# Patient Record
Sex: FEMALE | Race: WHITE | URBAN - METROPOLITAN AREA
[De-identification: names, ages, dates, MRNs, and addresses within clinical notes are randomized per-mention and may not be internally consistent; named-entity substitution may affect disease eponyms.]

---

## 2022-11-01 ENCOUNTER — LAB ENCOUNTER (OUTPATIENT)
Dept: LAB | Facility: HOSPITAL | Age: 42
End: 2022-11-01
Attending: ADVANCED PRACTICE MIDWIFE
Payer: COMMERCIAL

## 2022-11-01 ENCOUNTER — OFFICE VISIT (OUTPATIENT)
Dept: OBGYN CLINIC | Facility: CLINIC | Age: 42
End: 2022-11-01
Payer: COMMERCIAL

## 2022-11-01 VITALS
DIASTOLIC BLOOD PRESSURE: 68 MMHG | HEART RATE: 75 BPM | WEIGHT: 132 LBS | SYSTOLIC BLOOD PRESSURE: 107 MMHG | BODY MASS INDEX: 22.53 KG/M2 | HEIGHT: 64 IN

## 2022-11-01 DIAGNOSIS — Z13.1 DIABETES MELLITUS SCREENING: ICD-10-CM

## 2022-11-01 DIAGNOSIS — Z12.31 SCREENING MAMMOGRAM FOR BREAST CANCER: Primary | ICD-10-CM

## 2022-11-01 DIAGNOSIS — Z23 NEED FOR HPV VACCINATION: ICD-10-CM

## 2022-11-01 DIAGNOSIS — Z13.29 THYROID DISORDER SCREEN: ICD-10-CM

## 2022-11-01 DIAGNOSIS — Z13.220 LIPID SCREENING: ICD-10-CM

## 2022-11-01 DIAGNOSIS — Z32.00 PREGNANCY EXAMINATION OR TEST, PREGNANCY UNCONFIRMED: ICD-10-CM

## 2022-11-01 DIAGNOSIS — Z01.419 ENCOUNTER FOR ANNUAL ROUTINE GYNECOLOGICAL EXAMINATION: ICD-10-CM

## 2022-11-01 LAB
ALBUMIN SERPL-MCNC: 3.9 G/DL (ref 3.4–5)
ALBUMIN/GLOB SERPL: 1.1 {RATIO} (ref 1–2)
ALP LIVER SERPL-CCNC: 38 U/L
ALT SERPL-CCNC: 19 U/L
ANION GAP SERPL CALC-SCNC: 8 MMOL/L (ref 0–18)
AST SERPL-CCNC: 17 U/L (ref 15–37)
BILIRUB SERPL-MCNC: 0.3 MG/DL (ref 0.1–2)
BUN BLD-MCNC: 12 MG/DL (ref 7–18)
BUN/CREAT SERPL: 16.2 (ref 10–20)
CALCIUM BLD-MCNC: 9.3 MG/DL (ref 8.5–10.1)
CHLORIDE SERPL-SCNC: 108 MMOL/L (ref 98–112)
CHOLEST SERPL-MCNC: 292 MG/DL (ref ?–200)
CO2 SERPL-SCNC: 24 MMOL/L (ref 21–32)
CONTROL LINE PRESENT WITH A CLEAR BACKGROUND (YES/NO): YES YES/NO
CREAT BLD-MCNC: 0.74 MG/DL
DEPRECATED RDW RBC AUTO: 44.2 FL (ref 35.1–46.3)
ERYTHROCYTE [DISTWIDTH] IN BLOOD BY AUTOMATED COUNT: 12.4 % (ref 11–15)
EST. AVERAGE GLUCOSE BLD GHB EST-MCNC: 105 MG/DL (ref 68–126)
FASTING PATIENT LIPID ANSWER: YES
FASTING STATUS PATIENT QL REPORTED: YES
GFR SERPLBLD BASED ON 1.73 SQ M-ARVRAT: 105 ML/MIN/1.73M2 (ref 60–?)
GLOBULIN PLAS-MCNC: 3.5 G/DL (ref 2.8–4.4)
GLUCOSE BLD-MCNC: 104 MG/DL (ref 70–99)
HBA1C MFR BLD: 5.3 % (ref ?–5.7)
HCT VFR BLD AUTO: 36.9 %
HDLC SERPL-MCNC: 106 MG/DL (ref 40–59)
HGB BLD-MCNC: 12.1 G/DL
LDLC SERPL CALC-MCNC: 170 MG/DL (ref ?–100)
MCH RBC QN AUTO: 31.5 PG (ref 26–34)
MCHC RBC AUTO-ENTMCNC: 32.8 G/DL (ref 31–37)
MCV RBC AUTO: 96.1 FL
NONHDLC SERPL-MCNC: 186 MG/DL (ref ?–130)
OSMOLALITY SERPL CALC.SUM OF ELEC: 290 MOSM/KG (ref 275–295)
PLATELET # BLD AUTO: 238 10(3)UL (ref 150–450)
POTASSIUM SERPL-SCNC: 4.5 MMOL/L (ref 3.5–5.1)
PREGNANCY TEST, URINE: NEGATIVE
PROT SERPL-MCNC: 7.4 G/DL (ref 6.4–8.2)
RBC # BLD AUTO: 3.84 X10(6)UL
SODIUM SERPL-SCNC: 140 MMOL/L (ref 136–145)
TRIGL SERPL-MCNC: 99 MG/DL (ref 30–149)
TSI SER-ACNC: 1.56 MIU/ML (ref 0.36–3.74)
VLDLC SERPL CALC-MCNC: 20 MG/DL (ref 0–30)
WBC # BLD AUTO: 5.4 X10(3) UL (ref 4–11)

## 2022-11-01 PROCEDURE — 84443 ASSAY THYROID STIM HORMONE: CPT

## 2022-11-01 PROCEDURE — 3008F BODY MASS INDEX DOCD: CPT | Performed by: ADVANCED PRACTICE MIDWIFE

## 2022-11-01 PROCEDURE — 80053 COMPREHEN METABOLIC PANEL: CPT

## 2022-11-01 PROCEDURE — 80061 LIPID PANEL: CPT

## 2022-11-01 PROCEDURE — 81025 URINE PREGNANCY TEST: CPT | Performed by: ADVANCED PRACTICE MIDWIFE

## 2022-11-01 PROCEDURE — 85027 COMPLETE CBC AUTOMATED: CPT

## 2022-11-01 PROCEDURE — 83036 HEMOGLOBIN GLYCOSYLATED A1C: CPT

## 2022-11-01 PROCEDURE — 3078F DIAST BP <80 MM HG: CPT | Performed by: ADVANCED PRACTICE MIDWIFE

## 2022-11-01 PROCEDURE — 3074F SYST BP LT 130 MM HG: CPT | Performed by: ADVANCED PRACTICE MIDWIFE

## 2022-11-01 PROCEDURE — 90471 IMMUNIZATION ADMIN: CPT | Performed by: ADVANCED PRACTICE MIDWIFE

## 2022-11-01 PROCEDURE — 90651 9VHPV VACCINE 2/3 DOSE IM: CPT | Performed by: ADVANCED PRACTICE MIDWIFE

## 2022-11-01 PROCEDURE — 36415 COLL VENOUS BLD VENIPUNCTURE: CPT

## 2022-11-01 PROCEDURE — 99386 PREV VISIT NEW AGE 40-64: CPT | Performed by: ADVANCED PRACTICE MIDWIFE

## 2022-11-01 RX ORDER — NORGESTIMATE AND ETHINYL ESTRADIOL 7DAYSX3 28
1 KIT ORAL DAILY
Qty: 84 TABLET | Refills: 0 | Status: SHIPPED | OUTPATIENT
Start: 2022-11-01

## 2022-11-01 RX ORDER — NORGESTIMATE AND ETHINYL ESTRADIOL 7DAYSX3 28
KIT ORAL
COMMUNITY
Start: 2022-07-22 | End: 2022-11-01

## 2022-12-01 ENCOUNTER — TELEPHONE (OUTPATIENT)
Dept: OBGYN CLINIC | Facility: CLINIC | Age: 42
End: 2022-12-01

## 2022-12-06 NOTE — TELEPHONE ENCOUNTER
Spoke to patient, advised overdue mammogram. Patient stated she has been meaning to call back. Number given to patient to call and schedule. Patient requesting to schedule 2nd gardasil vaccine. appt schedule.  Patient agrees with plan and verbally understands

## 2023-01-04 ENCOUNTER — NURSE ONLY (OUTPATIENT)
Dept: OBGYN CLINIC | Facility: CLINIC | Age: 43
End: 2023-01-04
Payer: COMMERCIAL

## 2023-01-04 VITALS — SYSTOLIC BLOOD PRESSURE: 108 MMHG | DIASTOLIC BLOOD PRESSURE: 78 MMHG | HEART RATE: 64 BPM

## 2023-01-04 DIAGNOSIS — Z23 NEED FOR HPV VACCINATION: Primary | ICD-10-CM

## 2023-01-04 PROCEDURE — 90471 IMMUNIZATION ADMIN: CPT | Performed by: ADVANCED PRACTICE MIDWIFE

## 2023-01-04 PROCEDURE — 90651 9VHPV VACCINE 2/3 DOSE IM: CPT | Performed by: ADVANCED PRACTICE MIDWIFE

## 2023-01-04 RX ORDER — NORGESTIMATE AND ETHINYL ESTRADIOL 7DAYSX3 28
1 KIT ORAL DAILY
Qty: 84 TABLET | Refills: 3 | Status: SHIPPED | OUTPATIENT
Start: 2023-01-04

## 2023-01-04 NOTE — PROGRESS NOTES
Gardasil #2 given as ordered. Pt tolerated it well. Watched for 15mins post injection & pt experienced no side effects.  Pt to return for dose #3 in

## 2023-02-05 ENCOUNTER — HOSPITAL ENCOUNTER (OUTPATIENT)
Dept: MAMMOGRAPHY | Facility: HOSPITAL | Age: 43
Discharge: HOME | End: 2023-02-05
Attending: ADVANCED PRACTICE MIDWIFE
Payer: COMMERCIAL

## 2023-02-05 ENCOUNTER — HOSPITAL ENCOUNTER (OUTPATIENT)
Dept: MAMMOGRAPHY | Facility: HOSPITAL | Age: 43
End: 2023-02-05
Attending: ADVANCED PRACTICE MIDWIFE
Payer: COMMERCIAL

## 2023-02-05 DIAGNOSIS — Z01.419 ENCOUNTER FOR ANNUAL ROUTINE GYNECOLOGICAL EXAMINATION: ICD-10-CM

## 2023-02-05 DIAGNOSIS — Z12.31 SCREENING MAMMOGRAM FOR BREAST CANCER: ICD-10-CM

## 2023-02-05 PROCEDURE — 77067 SCR MAMMO BI INCL CAD: CPT | Performed by: ADVANCED PRACTICE MIDWIFE

## 2023-02-05 PROCEDURE — 77063 BREAST TOMOSYNTHESIS BI: CPT | Performed by: ADVANCED PRACTICE MIDWIFE

## 2023-04-18 ENCOUNTER — OFFICE VISIT (OUTPATIENT)
Dept: FAMILY MEDICINE CLINIC | Facility: CLINIC | Age: 43
End: 2023-04-18
Payer: COMMERCIAL

## 2023-04-18 VITALS
HEART RATE: 93 BPM | HEIGHT: 64 IN | TEMPERATURE: 97 F | DIASTOLIC BLOOD PRESSURE: 82 MMHG | OXYGEN SATURATION: 98 % | WEIGHT: 136 LBS | RESPIRATION RATE: 20 BRPM | SYSTOLIC BLOOD PRESSURE: 114 MMHG | BODY MASS INDEX: 23.22 KG/M2

## 2023-04-18 DIAGNOSIS — J04.0 LARYNGITIS: Primary | ICD-10-CM

## 2023-04-18 PROBLEM — M21.70 SHORT LEG SYNDROME, RIGHT, ACQUIRED: Status: ACTIVE | Noted: 2018-05-22

## 2023-04-18 PROCEDURE — 3074F SYST BP LT 130 MM HG: CPT

## 2023-04-18 PROCEDURE — 99203 OFFICE O/P NEW LOW 30 MIN: CPT

## 2023-04-18 PROCEDURE — 3008F BODY MASS INDEX DOCD: CPT

## 2023-04-18 PROCEDURE — 3079F DIAST BP 80-89 MM HG: CPT

## 2023-06-11 ENCOUNTER — OFFICE VISIT (OUTPATIENT)
Dept: FAMILY MEDICINE CLINIC | Facility: CLINIC | Age: 43
End: 2023-06-11
Payer: COMMERCIAL

## 2023-06-11 VITALS
OXYGEN SATURATION: 100 % | WEIGHT: 137.19 LBS | TEMPERATURE: 98 F | HEIGHT: 64 IN | DIASTOLIC BLOOD PRESSURE: 86 MMHG | BODY MASS INDEX: 23.42 KG/M2 | SYSTOLIC BLOOD PRESSURE: 112 MMHG | HEART RATE: 70 BPM | RESPIRATION RATE: 18 BRPM

## 2023-06-11 DIAGNOSIS — J06.9 URI WITH COUGH AND CONGESTION: Primary | ICD-10-CM

## 2023-06-11 PROBLEM — M99.05 PELVIC SOMATIC DYSFUNCTION: Status: ACTIVE | Noted: 2018-05-22

## 2023-06-11 PROBLEM — M89.8X1 CHRONIC SCAPULAR PAIN: Status: ACTIVE | Noted: 2018-05-22

## 2023-06-11 PROBLEM — M53.3 SACROILIAC JOINT DYSFUNCTION OF RIGHT SIDE: Status: ACTIVE | Noted: 2018-05-22

## 2023-06-11 PROBLEM — R29.3 POOR POSTURE: Status: ACTIVE | Noted: 2018-05-22

## 2023-06-11 PROBLEM — M25.551 HIP PAIN, RIGHT: Status: ACTIVE | Noted: 2018-05-22

## 2023-06-11 PROBLEM — G89.29 CHRONIC LEFT SHOULDER PAIN: Status: ACTIVE | Noted: 2018-05-22

## 2023-06-11 PROBLEM — M25.512 CHRONIC LEFT SHOULDER PAIN: Status: ACTIVE | Noted: 2018-05-22

## 2023-06-11 PROBLEM — M79.18 MYOFASCIAL PAIN: Status: ACTIVE | Noted: 2018-05-22

## 2023-06-11 PROBLEM — G89.29 CHRONIC SCAPULAR PAIN: Status: ACTIVE | Noted: 2018-05-22

## 2023-06-11 PROCEDURE — 3008F BODY MASS INDEX DOCD: CPT | Performed by: NURSE PRACTITIONER

## 2023-06-11 PROCEDURE — 3074F SYST BP LT 130 MM HG: CPT | Performed by: NURSE PRACTITIONER

## 2023-06-11 PROCEDURE — 3079F DIAST BP 80-89 MM HG: CPT | Performed by: NURSE PRACTITIONER

## 2023-06-11 PROCEDURE — 99213 OFFICE O/P EST LOW 20 MIN: CPT | Performed by: NURSE PRACTITIONER

## 2023-12-14 RX ORDER — NORGESTIMATE AND ETHINYL ESTRADIOL 7DAYSX3 28
1 KIT ORAL DAILY
Qty: 84 TABLET | Refills: 3 | OUTPATIENT
Start: 2023-12-14

## 2023-12-27 ENCOUNTER — OFFICE VISIT (OUTPATIENT)
Dept: OBGYN CLINIC | Facility: CLINIC | Age: 43
End: 2023-12-27
Payer: COMMERCIAL

## 2023-12-27 VITALS
WEIGHT: 144 LBS | SYSTOLIC BLOOD PRESSURE: 118 MMHG | HEART RATE: 88 BPM | HEIGHT: 64 IN | DIASTOLIC BLOOD PRESSURE: 84 MMHG | BODY MASS INDEX: 24.59 KG/M2

## 2023-12-27 DIAGNOSIS — Z01.419 ENCOUNTER FOR GYNECOLOGICAL EXAMINATION WITHOUT ABNORMAL FINDING: Primary | ICD-10-CM

## 2023-12-27 DIAGNOSIS — Z23 NEED FOR HPV VACCINATION: ICD-10-CM

## 2023-12-27 PROCEDURE — 90651 9VHPV VACCINE 2/3 DOSE IM: CPT | Performed by: ADVANCED PRACTICE MIDWIFE

## 2023-12-27 PROCEDURE — 90471 IMMUNIZATION ADMIN: CPT | Performed by: ADVANCED PRACTICE MIDWIFE

## 2023-12-27 PROCEDURE — 3008F BODY MASS INDEX DOCD: CPT | Performed by: ADVANCED PRACTICE MIDWIFE

## 2023-12-27 PROCEDURE — 3074F SYST BP LT 130 MM HG: CPT | Performed by: ADVANCED PRACTICE MIDWIFE

## 2023-12-27 PROCEDURE — 3079F DIAST BP 80-89 MM HG: CPT | Performed by: ADVANCED PRACTICE MIDWIFE

## 2023-12-27 PROCEDURE — 99396 PREV VISIT EST AGE 40-64: CPT | Performed by: ADVANCED PRACTICE MIDWIFE

## 2023-12-27 RX ORDER — NORGESTIMATE AND ETHINYL ESTRADIOL 7DAYSX3 28
1 KIT ORAL DAILY
Qty: 84 TABLET | Refills: 3 | Status: SHIPPED | OUTPATIENT
Start: 2023-12-27

## 2023-12-27 NOTE — PROGRESS NOTES
Chief Complaint   Patient presents with    Annual     Last pap  normal results, 3rd hpv vaccine , OCP refill,       HPI:   Cortney Lopez is 41year old , here today for routine annual gyn exam. Needs last of the HPV vaccine series and OCP refill. Remains happy with the method. Routine STI screening: declines  Cycles: monthly/regular  Family history of breast, ovarian, or uterine cancer: denies  Significant Gyn history: Had an abnormal pap smear about 10 years ago, all normal since. Last pap was at office affiliated with [facility] in , normal  Last mammogram 2023, WNL  Does not have a PCP and welcomes a referral    Note: This is a gyn only visit. Pt has PCP and is referred back to PCP for care of any non-gyn concerns listed above in the Problem List.    HISTORY:  No past medical history on file. Hx Prior Abnormal Pap: No  Pap Date: 21  Pap Result Notes: Negative    Past Surgical History:   Procedure Laterality Date    REMOVAL OF TONSILS,12+ Y/O      WISDOM TEETH REMOVED Bilateral       Family History   Problem Relation Age of Onset    Cancer Maternal Grandfather       Social History:   Social History     Socioeconomic History    Marital status: Single   Tobacco Use    Smoking status: Never     Passive exposure: Never    Smokeless tobacco: Never   Substance and Sexual Activity    Alcohol use:  Yes     Alcohol/week: 1.0 standard drink of alcohol     Types: 1 Glasses of wine per week     Comment: social    Drug use: Never       Safe relationship/safe home environment      Depression Screening (PHQ-2/PHQ-9): Over the LAST 2 WEEKS   Little interest or pleasure in doing things (over the last two weeks)?: Not at all    Feeling down, depressed, or hopeless (over the last two weeks)?: Not at all    PHQ-2 SCORE: 0        Immunization History   Administered Date(s) Administered    Hpv Virus Vaccine 9 Elsa Im 2022, 2023        Medications (Active prior to today's visit):  Current Outpatient Medications   Medication Sig Dispense Refill    TRI-ESTARYLLA 0.18/0.215/0.25 MG-35 MCG Oral Tab Take 1 tablet by mouth daily. 84 tablet 3       Allergies:  No Known Allergies    ROS:  Review of Systems   Constitutional: Negative. Genitourinary: Negative. PHYSICAL EXAM:  Vitals:    12/27/23 0845   BP: 118/84   Pulse: 88     Physical Exam  Vitals reviewed. Constitutional:       Appearance: Normal appearance. HENT:      Head: Normocephalic. Pulmonary:      Effort: Pulmonary effort is normal.   Chest:   Breasts:     Breasts are symmetrical.      Right: No inverted nipple, mass, nipple discharge or tenderness. Left: No inverted nipple, mass, nipple discharge or tenderness. Genitourinary:     Comments: Pelvic exam deferred. No contributory history  Neurological:      Mental Status: She is alert and oriented to person, place, and time. Psychiatric:         Behavior: Behavior normal.         Thought Content: Thought content normal.         Judgment: Judgment normal.           ASSESSMENT/PLAN:     Luis Alberto Covarrubias was seen today for annual.    Diagnoses and all orders for this visit:    Encounter for gynecological examination without abnormal finding  -     MICHELLE SCREENING BILAT (CPT=77067); Future  -     TRI-ESTARYLLA 0.18/0.215/0.25 MG-35 MCG Oral Tab; Take 1 tablet by mouth daily. Need for HPV vaccination  -     HPV HUMAN PAPILLOMA VIRUS VACC 9 ALEJANDRO 3 DOSE IM       Next annual due: 1 year  Follow-up/Return to clinic: next available with PCP to establish care and for other routine health maintenance. Primary care brochure provided with recommendations at [facility] per request    Counseling:   Frequency of health screening and personal risks  Pap, SBE/CBE, mammography    Patient verbalized understanding, All questions answered.  No barriers to learning identified

## 2024-01-15 ENCOUNTER — APPOINTMENT (OUTPATIENT)
Dept: MRI IMAGING | Facility: HOSPITAL | Age: 44
End: 2024-01-15
Attending: EMERGENCY MEDICINE
Payer: COMMERCIAL

## 2024-01-15 ENCOUNTER — HOSPITAL ENCOUNTER (OUTPATIENT)
Age: 44
Discharge: ANOTHER HOSPITAL | End: 2024-01-15
Payer: COMMERCIAL

## 2024-01-15 ENCOUNTER — HOSPITAL ENCOUNTER (EMERGENCY)
Facility: HOSPITAL | Age: 44
Discharge: HOME | End: 2024-01-15
Attending: EMERGENCY MEDICINE
Payer: COMMERCIAL

## 2024-01-15 VITALS
DIASTOLIC BLOOD PRESSURE: 80 MMHG | WEIGHT: 138 LBS | BODY MASS INDEX: 23.56 KG/M2 | SYSTOLIC BLOOD PRESSURE: 113 MMHG | TEMPERATURE: 98 F | HEART RATE: 62 BPM | HEIGHT: 64 IN | RESPIRATION RATE: 16 BRPM | OXYGEN SATURATION: 99 %

## 2024-01-15 VITALS
SYSTOLIC BLOOD PRESSURE: 113 MMHG | HEART RATE: 103 BPM | TEMPERATURE: 98 F | DIASTOLIC BLOOD PRESSURE: 79 MMHG | OXYGEN SATURATION: 100 % | RESPIRATION RATE: 18 BRPM

## 2024-01-15 DIAGNOSIS — H81.10 BENIGN PAROXYSMAL POSITIONAL VERTIGO, UNSPECIFIED LATERALITY: Primary | ICD-10-CM

## 2024-01-15 DIAGNOSIS — R42 DIZZINESS: Primary | ICD-10-CM

## 2024-01-15 DIAGNOSIS — R06.02 SOB (SHORTNESS OF BREATH): ICD-10-CM

## 2024-01-15 LAB
ALBUMIN SERPL-MCNC: 4.3 G/DL (ref 3.2–4.8)
ALBUMIN/GLOB SERPL: 1.5 {RATIO} (ref 1–2)
ALP LIVER SERPL-CCNC: 37 U/L
ALT SERPL-CCNC: 10 U/L
ANION GAP SERPL CALC-SCNC: 6 MMOL/L (ref 0–18)
AST SERPL-CCNC: 17 U/L (ref ?–34)
ATRIAL RATE: 64 BPM
B-HCG UR QL: NEGATIVE
BASOPHILS # BLD AUTO: 0.08 X10(3) UL (ref 0–0.2)
BASOPHILS NFR BLD AUTO: 1.6 %
BILIRUB SERPL-MCNC: 0.4 MG/DL (ref 0.3–1.2)
BUN BLD-MCNC: 12 MG/DL (ref 9–23)
BUN/CREAT SERPL: 15 (ref 10–20)
CALCIUM BLD-MCNC: 9.3 MG/DL (ref 8.7–10.4)
CHLORIDE SERPL-SCNC: 106 MMOL/L (ref 98–112)
CO2 SERPL-SCNC: 27 MMOL/L (ref 21–32)
CREAT BLD-MCNC: 0.8 MG/DL
DEPRECATED RDW RBC AUTO: 42.3 FL (ref 35.1–46.3)
EGFRCR SERPLBLD CKD-EPI 2021: 95 ML/MIN/1.73M2 (ref 60–?)
EOSINOPHIL # BLD AUTO: 0.22 X10(3) UL (ref 0–0.7)
EOSINOPHIL NFR BLD AUTO: 4.3 %
ERYTHROCYTE [DISTWIDTH] IN BLOOD BY AUTOMATED COUNT: 12.3 % (ref 11–15)
GLOBULIN PLAS-MCNC: 2.9 G/DL (ref 2.8–4.4)
GLUCOSE BLD-MCNC: 101 MG/DL (ref 70–99)
HCT VFR BLD AUTO: 38.6 %
HGB BLD-MCNC: 12.6 G/DL
IMM GRANULOCYTES # BLD AUTO: 0.02 X10(3) UL (ref 0–1)
IMM GRANULOCYTES NFR BLD: 0.4 %
LYMPHOCYTES # BLD AUTO: 1.7 X10(3) UL (ref 1–4)
LYMPHOCYTES NFR BLD AUTO: 33.5 %
MCH RBC QN AUTO: 30.3 PG (ref 26–34)
MCHC RBC AUTO-ENTMCNC: 32.6 G/DL (ref 31–37)
MCV RBC AUTO: 92.8 FL
MONOCYTES # BLD AUTO: 0.22 X10(3) UL (ref 0.1–1)
MONOCYTES NFR BLD AUTO: 4.3 %
NEUTROPHILS # BLD AUTO: 2.84 X10 (3) UL (ref 1.5–7.7)
NEUTROPHILS # BLD AUTO: 2.84 X10(3) UL (ref 1.5–7.7)
NEUTROPHILS NFR BLD AUTO: 55.9 %
OSMOLALITY SERPL CALC.SUM OF ELEC: 288 MOSM/KG (ref 275–295)
P AXIS: 68 DEGREES
P-R INTERVAL: 134 MS
PLATELET # BLD AUTO: 434 10(3)UL (ref 150–450)
POTASSIUM SERPL-SCNC: 3.9 MMOL/L (ref 3.5–5.1)
PROT SERPL-MCNC: 7.2 G/DL (ref 5.7–8.2)
Q-T INTERVAL: 404 MS
QRS DURATION: 78 MS
QTC CALCULATION (BEZET): 416 MS
R AXIS: 72 DEGREES
RBC # BLD AUTO: 4.16 X10(6)UL
SODIUM SERPL-SCNC: 139 MMOL/L (ref 136–145)
T AXIS: 56 DEGREES
VENTRICULAR RATE: 64 BPM
WBC # BLD AUTO: 5.1 X10(3) UL (ref 4–11)

## 2024-01-15 PROCEDURE — 99284 EMERGENCY DEPT VISIT MOD MDM: CPT

## 2024-01-15 PROCEDURE — 99214 OFFICE O/P EST MOD 30 MIN: CPT | Performed by: PHYSICIAN ASSISTANT

## 2024-01-15 PROCEDURE — 93010 ELECTROCARDIOGRAM REPORT: CPT

## 2024-01-15 PROCEDURE — 93005 ELECTROCARDIOGRAM TRACING: CPT

## 2024-01-15 PROCEDURE — 96374 THER/PROPH/DIAG INJ IV PUSH: CPT

## 2024-01-15 PROCEDURE — 85025 COMPLETE CBC W/AUTO DIFF WBC: CPT | Performed by: EMERGENCY MEDICINE

## 2024-01-15 PROCEDURE — 81025 URINE PREGNANCY TEST: CPT

## 2024-01-15 PROCEDURE — 70551 MRI BRAIN STEM W/O DYE: CPT | Performed by: EMERGENCY MEDICINE

## 2024-01-15 PROCEDURE — 80053 COMPREHEN METABOLIC PANEL: CPT | Performed by: EMERGENCY MEDICINE

## 2024-01-15 RX ORDER — ONDANSETRON 2 MG/ML
4 INJECTION INTRAMUSCULAR; INTRAVENOUS ONCE
Status: COMPLETED | OUTPATIENT
Start: 2024-01-15 | End: 2024-01-15

## 2024-01-15 RX ORDER — MECLIZINE HYDROCHLORIDE 25 MG/1
25 TABLET ORAL 3 TIMES DAILY PRN
Qty: 20 TABLET | Refills: 0 | Status: SHIPPED | OUTPATIENT
Start: 2024-01-15

## 2024-01-15 RX ORDER — ONDANSETRON 4 MG/1
4 TABLET, ORALLY DISINTEGRATING ORAL EVERY 4 HOURS PRN
Qty: 10 TABLET | Refills: 0 | Status: SHIPPED | OUTPATIENT
Start: 2024-01-15 | End: 2024-01-22

## 2024-01-15 RX ORDER — MECLIZINE HYDROCHLORIDE 25 MG/1
25 TABLET ORAL ONCE
Status: COMPLETED | OUTPATIENT
Start: 2024-01-15 | End: 2024-01-15

## 2024-01-15 NOTE — ED PROVIDER NOTES
Patient Seen in: [facility] Emergency Department      History     Chief Complaint   Patient presents with    Dizziness     Stated Complaint: dizziness    Subjective:   HPI    43-year-old female without significant past medical history presents with complaints of dizziness and nausea.  The patient reports onset of symptoms yesterday while she was exercising.  She reports that she was doing Pilates when she began experiencing dizziness.  She states the symptoms were mild at the time.  She stopped exercising and the symptoms seemed to improve.  She then began having symptoms again this morning while she was lying in bed.  She reports feeling dizziness as if things were moving or spinning.  She reports the symptoms were mild again this morning but when she got out of bed and began to walk she reports the symptoms became much more intense.  She reports associated nausea without vomiting.  She denies any headache.  She denies any vision or speech changes.  She denies focal weakness or numbness.  She initially presented to immediate care and was sent to the ED for evaluation.  She reports minimal nausea at present but continues to report dizziness especially with head movement.  No history of similar symptoms in the past.    Objective:   History reviewed. No pertinent past medical history.           Past Surgical History:   Procedure Laterality Date    REMOVAL OF TONSILS,12+ Y/O  2000    WISDOM TEETH REMOVED Bilateral 2002                Social History     Socioeconomic History    Marital status: Single   Tobacco Use    Smoking status: Never     Passive exposure: Never    Smokeless tobacco: Never   Substance and Sexual Activity    Alcohol use: Yes     Alcohol/week: 1.0 standard drink of alcohol     Types: 1 Glasses of wine per week     Comment: social    Drug use: Never              Review of Systems    Positive for stated complaint: dizziness  Other systems are as noted in HPI.  Constitutional and vital signs  reviewed.      All other systems reviewed and negative except as noted above.    Physical Exam     ED Triage Vitals [01/15/24 0956]   /79   Pulse 79   Resp 18   Temp 97.6 °F (36.4 °C)   Temp src Oral   SpO2 100 %   O2 Device None (Room air)       Current:/80   Pulse 62   Temp 97.6 °F (36.4 °C) (Oral)   Resp 12   Ht 162.6 cm (5' 4\")   Wt 62.6 kg   LMP 01/10/2024 (Approximate)   SpO2 100%   BMI 23.69 kg/m²         Physical Exam      General Appearance: alert, no distress  Eyes: pupils equal and round no pallor or injection.  Mild rightward nystagmus noted.  EOMI.  ENT, Mouth: mucous membranes moist.  Bilateral TMs and auditory canals normal-appearing.  Respiratory: there are no retractions, lungs are clear to auscultation  Cardiovascular: regular rate and rhythm  Gastrointestinal:  abdomen is soft and non tender, no masses, bowel sounds normal  Neurological: Speech normal.  Cranial nerves II through XII intact.  No focal motor or sensory deficits to extremities x 4.  Cerebellum intact to finger-to-nose.  Skin: warm and dry, no rashes.  Musculoskeletal: neck is supple non tender        Extremities are symmetrical, full range of motion  Psychiatric: patient is oriented X 3, there is no agitation    DIFFERENTIAL DIAGNOSIS: After history and physical exam differential diagnosis was considered for vertigo including central causes such as benign positional vertigo, Ménière's disease, viral labyrinthitis and central causes such as CVA, tumor        ED Course     Labs Reviewed   COMP METABOLIC PANEL (14) - Abnormal; Notable for the following components:       Result Value    Glucose 101 (*)     All other components within normal limits   POCT PREGNANCY URINE - Normal   CBC WITH DIFFERENTIAL WITH PLATELET    Narrative:     The following orders were created for panel order CBC With Differential With Platelet.  Procedure                               Abnormality         Status                     ---------                                -----------         ------                     CBC W/ DIFFERENTIAL[[id number]]                              Final result                 Please view results for these tests on the individual orders.   RAINBOW DRAW BLUE   RAINBOW DRAW GOLD   CBC W/ DIFFERENTIAL     EKG    Rate, intervals and axes as noted on EKG Report.  Rate: 64  Rhythm: Sinus Rhythm  Axis: Normal  Reading: Normal EKG                        MDM      Lab, EKG, and MRI results noted.  Patient with some improvement post medications.  Suspect benign positional vertigo.  Will discharge home with meclizine, Zofran, and primary care follow-up.  She is advised to return if worsening symptoms develop.                                   Medical Decision Making      Disposition and Plan     Clinical Impression:  1. Benign paroxysmal positional vertigo, unspecified laterality         Disposition:  Discharge  1/15/2024  1:31 pm    Follow-up:  Anila Braun MD  [address] [place] [id number]  [phone]    Follow up            Medications Prescribed:  Current Discharge Medication List        START taking these medications    Details   ondansetron 4 MG Oral Tablet Dispersible Take 1 tablet (4 mg total) by mouth every 4 (four) hours as needed for Nausea.  Qty: 10 tablet, Refills: 0      meclizine 25 MG Oral Tab Take 1 tablet (25 mg total) by mouth 3 (three) times daily as needed.  Qty: 20 tablet, Refills: 0

## 2024-01-15 NOTE — ED QUICK NOTES
Patient reports dizziness like the room is spinning during her morning workout yesterday she stopped exercising, dizziness subsided and then she ate some food; she noticed some difficulty walking down the stairs. Dizziness did not return until last night while she was sleeping she could feel the dizziness again and states since she's been here its gotten better and noticed her balance was off and couldn't walk in a straight line, while in ED cart she noticed after turing her head it made dizziness/room spin; pt never dx with vertigo and never had this happen to her before so felt the need to get checked out by a doctor. AXOX4. No slurred speech, unilateral weakness, arm drift, facial droop, or numbness or tingling.

## 2024-01-15 NOTE — ED PROVIDER NOTES
Patient Seen in: [facility]      History     Chief Complaint   Patient presents with    Dizziness     Stated Complaint: Dizziness    Subjective:   HPI    42 yo female with no PMH presenting with c/o dizziness.  States yesterday during an ab work out she began to feel dizzy \"like she would pass out\" She stopped the work out, had something to eat and symptoms resolved. This AM while laying in bed she again had dizziness with head movements, feels like she can't walk in a straight line. There is associated nausea and also pt notes when walking she feels like work of breathing is increased. She denies recent URI symptoms. No recent change in medications. She has not taken anything for her symptoms. Denies weakness, speech changes, HA.     Objective:   History reviewed. No pertinent past medical history.           Past Surgical History:   Procedure Laterality Date    REMOVAL OF TONSILS,12+ Y/O  2000    WISDOM TEETH REMOVED Bilateral 2002                Social History     Socioeconomic History    Marital status: Single   Tobacco Use    Smoking status: Never     Passive exposure: Never    Smokeless tobacco: Never   Substance and Sexual Activity    Alcohol use: Yes     Alcohol/week: 1.0 standard drink of alcohol     Types: 1 Glasses of wine per week     Comment: social    Drug use: Never              Review of Systems    Positive for stated complaint: Dizziness  Other systems are as noted in HPI.  Constitutional and vital signs reviewed.      All other systems reviewed and negative except as noted above.    Physical Exam     ED Triage Vitals [01/15/24 0854]   /79   Pulse 103   Resp 18   Temp 98.3 °F (36.8 °C)   Temp src Temporal   SpO2 100 %   O2 Device None (Room air)       Current:/79   Pulse 103   Temp 98.3 °F (36.8 °C) (Temporal)   Resp 18   LMP 01/10/2024 (Approximate)   SpO2 100%         Physical Exam  Vitals and nursing note reviewed.   Constitutional:       General: She is not in  acute distress.  HENT:      Head: Normocephalic and atraumatic.      Right Ear: External ear normal.      Left Ear: External ear normal.      Nose: Nose normal.      Mouth/Throat:      Mouth: Mucous membranes are moist.   Eyes:      Extraocular Movements: Extraocular movements intact.      Pupils: Pupils are equal, round, and reactive to light.   Cardiovascular:      Rate and Rhythm: Normal rate.   Pulmonary:      Effort: Pulmonary effort is normal.   Abdominal:      General: Abdomen is flat.   Musculoskeletal:         General: Normal range of motion.      Cervical back: Normal range of motion.   Skin:     General: Skin is warm.   Neurological:      General: No focal deficit present.      Mental Status: She is alert and oriented to person, place, and time.      Cranial Nerves: No cranial nerve deficit.   Psychiatric:         Mood and Affect: Mood normal.         Behavior: Behavior normal.               ED Course   Labs Reviewed - No data to display     43-year-old female presenting for evaluation of dizziness.  Patient describes initial episode yesterday while working out which sounds more syncopal in nature.  Today on waking dizziness when looking side-to-side, head movements more suggestive of BPPV  She has no history of vertigo.  Denies any experience with dizziness.  She is on OCP, heart rate 103 and describes feeling short of breath with exertion    Ddx-BPPV, near syncope, PE, TIA versus CVA  Discussed with patient and friend the limitations of this facility and the need for additional workup, potentially imaging related to her chief complaint.  She is agreeable to [facility] at this time. Friend will drive her              [facility]                                         Medical Decision Making      Disposition and Plan     Clinical Impression:  1. Dizziness    2. SOB (shortness of breath)         Disposition:  Ic to ed  1/15/2024 10:06 am    Follow-up:  No follow-up provider specified.        Medications  Prescribed:  Discharge Medication List as of 1/15/2024  9:30 AM

## 2024-01-15 NOTE — ED INITIAL ASSESSMENT (HPI)
Patient states yesterday, she was working out, felt dizzy like she will pass out, stopped working out, and \"felt weird rest of the day.\" Since this morning, patient states she has been dizzy and off balance resulting in nausea.  +\"face feels hot since this morning.\"

## 2024-01-15 NOTE — DISCHARGE INSTRUCTIONS
Take meclizine as prescribed, as needed for dizziness.  Take Zofran as needed for nausea or vomiting.  Drink plenty of fluids.  Follow-up with a primary care physician for reevaluation.  Return to the emergency department if worsening dizziness, focal weakness, repeated vomiting or other new symptoms develop.

## 2024-01-15 NOTE — ED INITIAL ASSESSMENT (HPI)
Sudden onset of dizziness this morning. Feels unsteady walking. Denies feeling \"faint\", acute illness, new medications

## 2024-01-26 ENCOUNTER — TELEPHONE (OUTPATIENT)
Dept: OBGYN CLINIC | Facility: CLINIC | Age: 44
End: 2024-01-26

## 2024-02-14 ENCOUNTER — HOSPITAL ENCOUNTER (OUTPATIENT)
Dept: MAMMOGRAPHY | Age: 44
Discharge: HOME | End: 2024-02-14
Attending: ADVANCED PRACTICE MIDWIFE
Payer: COMMERCIAL

## 2024-02-14 DIAGNOSIS — Z01.419 ENCOUNTER FOR GYNECOLOGICAL EXAMINATION WITHOUT ABNORMAL FINDING: ICD-10-CM

## 2024-02-14 PROCEDURE — 77063 BREAST TOMOSYNTHESIS BI: CPT | Performed by: ADVANCED PRACTICE MIDWIFE

## 2024-02-14 PROCEDURE — 77067 SCR MAMMO BI INCL CAD: CPT | Performed by: ADVANCED PRACTICE MIDWIFE

## 2024-03-23 ENCOUNTER — LAB ENCOUNTER (OUTPATIENT)
Dept: LAB | Age: 44
End: 2024-03-23
Attending: STUDENT IN AN ORGANIZED HEALTH CARE EDUCATION/TRAINING PROGRAM
Payer: COMMERCIAL

## 2024-03-23 ENCOUNTER — OFFICE VISIT (OUTPATIENT)
Dept: FAMILY MEDICINE CLINIC | Facility: CLINIC | Age: 44
End: 2024-03-23

## 2024-03-23 VITALS
BODY MASS INDEX: 23.39 KG/M2 | DIASTOLIC BLOOD PRESSURE: 65 MMHG | WEIGHT: 137 LBS | HEIGHT: 64 IN | SYSTOLIC BLOOD PRESSURE: 100 MMHG | HEART RATE: 87 BPM | OXYGEN SATURATION: 96 %

## 2024-03-23 DIAGNOSIS — Z00.00 WELL ADULT EXAM: Primary | ICD-10-CM

## 2024-03-23 DIAGNOSIS — Z00.00 WELL ADULT EXAM: ICD-10-CM

## 2024-03-23 DIAGNOSIS — M72.2 PLANTAR FASCIITIS: ICD-10-CM

## 2024-03-23 LAB
ALBUMIN SERPL-MCNC: 4.6 G/DL (ref 3.2–4.8)
ALBUMIN/GLOB SERPL: 1.6 {RATIO} (ref 1–2)
ALP LIVER SERPL-CCNC: 35 U/L
ALT SERPL-CCNC: 19 U/L
ANION GAP SERPL CALC-SCNC: 5 MMOL/L (ref 0–18)
AST SERPL-CCNC: 27 U/L (ref ?–34)
BILIRUB SERPL-MCNC: 0.6 MG/DL (ref 0.3–1.2)
BUN BLD-MCNC: 13 MG/DL (ref 9–23)
BUN/CREAT SERPL: 14.3 (ref 10–20)
CALCIUM BLD-MCNC: 9.5 MG/DL (ref 8.7–10.4)
CHLORIDE SERPL-SCNC: 107 MMOL/L (ref 98–112)
CHOLEST SERPL-MCNC: 257 MG/DL (ref ?–200)
CO2 SERPL-SCNC: 27 MMOL/L (ref 21–32)
CREAT BLD-MCNC: 0.91 MG/DL
DEPRECATED RDW RBC AUTO: 46.7 FL (ref 35.1–46.3)
EGFRCR SERPLBLD CKD-EPI 2021: 81 ML/MIN/1.73M2 (ref 60–?)
ERYTHROCYTE [DISTWIDTH] IN BLOOD BY AUTOMATED COUNT: 13.3 % (ref 11–15)
EST. AVERAGE GLUCOSE BLD GHB EST-MCNC: 105 MG/DL (ref 68–126)
FASTING PATIENT LIPID ANSWER: YES
FASTING STATUS PATIENT QL REPORTED: YES
GLOBULIN PLAS-MCNC: 2.8 G/DL (ref 2.8–4.4)
GLUCOSE BLD-MCNC: 86 MG/DL (ref 70–99)
HBA1C MFR BLD: 5.3 % (ref ?–5.7)
HCT VFR BLD AUTO: 36.9 %
HDLC SERPL-MCNC: 84 MG/DL (ref 40–59)
HGB BLD-MCNC: 12.2 G/DL
LDLC SERPL CALC-MCNC: 148 MG/DL (ref ?–100)
MCH RBC QN AUTO: 31.3 PG (ref 26–34)
MCHC RBC AUTO-ENTMCNC: 33.1 G/DL (ref 31–37)
MCV RBC AUTO: 94.6 FL
NONHDLC SERPL-MCNC: 173 MG/DL (ref ?–130)
OSMOLALITY SERPL CALC.SUM OF ELEC: 287 MOSM/KG (ref 275–295)
PLATELET # BLD AUTO: 237 10(3)UL (ref 150–450)
POTASSIUM SERPL-SCNC: 4.4 MMOL/L (ref 3.5–5.1)
PROT SERPL-MCNC: 7.4 G/DL (ref 5.7–8.2)
RBC # BLD AUTO: 3.9 X10(6)UL
SODIUM SERPL-SCNC: 139 MMOL/L (ref 136–145)
TRIGL SERPL-MCNC: 142 MG/DL (ref 30–149)
TSI SER-ACNC: 1.97 MIU/ML (ref 0.55–4.78)
VIT D+METAB SERPL-MCNC: 13.5 NG/ML (ref 30–100)
VLDLC SERPL CALC-MCNC: 27 MG/DL (ref 0–30)
WBC # BLD AUTO: 6.5 X10(3) UL (ref 4–11)

## 2024-03-23 PROCEDURE — 3008F BODY MASS INDEX DOCD: CPT | Performed by: STUDENT IN AN ORGANIZED HEALTH CARE EDUCATION/TRAINING PROGRAM

## 2024-03-23 PROCEDURE — 3078F DIAST BP <80 MM HG: CPT | Performed by: STUDENT IN AN ORGANIZED HEALTH CARE EDUCATION/TRAINING PROGRAM

## 2024-03-23 PROCEDURE — 3074F SYST BP LT 130 MM HG: CPT | Performed by: STUDENT IN AN ORGANIZED HEALTH CARE EDUCATION/TRAINING PROGRAM

## 2024-03-23 PROCEDURE — 80053 COMPREHEN METABOLIC PANEL: CPT

## 2024-03-23 PROCEDURE — 85027 COMPLETE CBC AUTOMATED: CPT

## 2024-03-23 PROCEDURE — 80061 LIPID PANEL: CPT

## 2024-03-23 PROCEDURE — 83036 HEMOGLOBIN GLYCOSYLATED A1C: CPT

## 2024-03-23 PROCEDURE — 84443 ASSAY THYROID STIM HORMONE: CPT

## 2024-03-23 PROCEDURE — 82306 VITAMIN D 25 HYDROXY: CPT

## 2024-03-23 PROCEDURE — 99386 PREV VISIT NEW AGE 40-64: CPT | Performed by: STUDENT IN AN ORGANIZED HEALTH CARE EDUCATION/TRAINING PROGRAM

## 2024-03-23 PROCEDURE — 36415 COLL VENOUS BLD VENIPUNCTURE: CPT

## 2024-03-23 NOTE — PROGRESS NOTES
HPI:    Patient ID: Maria Luz Beasley is a 43 year old female.    HPI  Pt presenting for routine physical exam. Denies any acute issues or recent illnesses. No significant chronic medical problems. Past medical/surgical history, family history, and social history were reviewed.     Last physical 2022  Last Pap 8/2021    New job with more time  Working on dietary consistency  Reports h/o plantar fasciitis    Mood stable, denies SH/SI/HI      Review of Systems   A comprehensive 10 point review of systems was completed.  Pertinent positives and negatives noted in the the HPI.       Current Outpatient Medications   Medication Sig Dispense Refill    TRI-ESTARYLLA 0.18/0.215/0.25 MG-35 MCG Oral Tab Take 1 tablet by mouth daily. 84 tablet 3    ergocalciferol 1.25 MG (10540 UT) Oral Cap Take 1 capsule (50,000 Units total) by mouth once a week. 24 capsule 0     Allergies:No Known Allergies   Vitals:    03/23/24 0945   BP: 100/65   Pulse: 87   SpO2: 96%   Weight: 137 lb (62.1 kg)   Height: 5' 4\" (1.626 m)       Body mass index is 23.52 kg/m².   PHYSICAL EXAM:   Physical Exam  Vitals reviewed.   Constitutional:       General: She is not in acute distress.     Appearance: Normal appearance. She is well-developed.   HENT:      Head: Normocephalic and atraumatic.      Right Ear: Tympanic membrane, ear canal and external ear normal.      Left Ear: Tympanic membrane, ear canal and external ear normal.   Eyes:      Conjunctiva/sclera: Conjunctivae normal.   Neck:      Thyroid: No thyroid mass or thyroid tenderness.   Cardiovascular:      Rate and Rhythm: Normal rate and regular rhythm.      Pulses: Normal pulses.      Heart sounds: Normal heart sounds, S1 normal and S2 normal. No murmur heard.  Pulmonary:      Effort: Pulmonary effort is normal. No respiratory distress.      Breath sounds: Normal breath sounds. No wheezing, rhonchi or rales.   Abdominal:      General: Bowel sounds are normal.      Palpations: Abdomen is soft.       Tenderness: There is no abdominal tenderness. There is no guarding or rebound.   Musculoskeletal:      Cervical back: Normal range of motion and neck supple. No muscular tenderness.      Right lower leg: No edema.      Left lower leg: No edema.   Lymphadenopathy:      Cervical: No cervical adenopathy.   Skin:     General: Skin is warm and dry.      Coloration: Skin is not jaundiced.   Neurological:      General: No focal deficit present.      Mental Status: She is alert and oriented to person, place, and time. Mental status is at baseline.   Psychiatric:         Attention and Perception: Attention normal.         Mood and Affect: Mood normal.         Behavior: Behavior normal. Behavior is cooperative.         Cognition and Memory: Cognition normal.             ASSESSMENT/PLAN:   1. Well adult exam  Discussed preventative screenings  - Pap 8/2021  - mammogram 2/2024  - will check labs as below  - encouraged to continue diet/exercise for overall wellness  - follow-up with eye doctor annually  - follow-up with dentist every 6 months  - return yearly for physicals  - annual flu shot  - tetanus booster every 10yrs  - TSH W Reflex To Free T4; Future  - CBC, Platelet; No Differential; Future  - Comp Metabolic Panel (14); Future  - Hemoglobin A1C; Future  - Lipid Panel; Future  - Vitamin D; Future    2. Plantar fasciitis  Encouraged stretching exercises, icing as able    Pt verbalized understanding and agrees with plan.    Orders Placed This Encounter   Procedures    TSH W Reflex To Free T4    CBC, Platelet; No Differential    Comp Metabolic Panel (14)    Hemoglobin A1C    Lipid Panel    Vitamin D       Meds This Visit:  Requested Prescriptions      No prescriptions requested or ordered in this encounter       Imaging & Referrals:  None         ID#[id number]

## 2024-06-05 ENCOUNTER — HOSPITAL ENCOUNTER (OUTPATIENT)
Age: 44
Discharge: HOME | End: 2024-06-05
Payer: COMMERCIAL

## 2024-06-05 VITALS
DIASTOLIC BLOOD PRESSURE: 74 MMHG | RESPIRATION RATE: 22 BRPM | SYSTOLIC BLOOD PRESSURE: 140 MMHG | OXYGEN SATURATION: 100 % | HEART RATE: 85 BPM | TEMPERATURE: 98 F

## 2024-06-05 DIAGNOSIS — H81.10 BENIGN PAROXYSMAL POSITIONAL VERTIGO, UNSPECIFIED LATERALITY: Primary | ICD-10-CM

## 2024-06-05 PROCEDURE — 99213 OFFICE O/P EST LOW 20 MIN: CPT | Performed by: NURSE PRACTITIONER

## 2024-06-05 NOTE — DISCHARGE INSTRUCTIONS
Appointment with Dr. Bah on Friday morning at 7 AM.  He will do further testing and evaluation and determine best protocol for your symptoms    Take meclizine 3 times a day to help with symptoms you are experiencing.  This medication can help with fluid buildup in the ear that can cause increased dizziness.  If this medication makes you sleepy or drowsy take 1 before bedtime nightly.    Go to ER for worsening symptoms including numbness or tingling to extremities, slurred speech, unable to form sentences or speech, worsening dizziness, vision changes, worst headache of your life

## 2024-06-05 NOTE — ED INITIAL ASSESSMENT (HPI)
Pt c/o intermittent dizziness x6 days, worse with moving head.  Hx of vertigo x1 1/2024. Seen at [facility] ER and had MRI done.  States started while working out.   States better after doing vertigo exercises.  No ear pain.  [place] the country [place] 3 [place].

## 2024-06-05 NOTE — ED PROVIDER NOTES
Patient Seen in: [facility]      History     Chief Complaint   Patient presents with    Ear Problem     vertigo (2nd episode ever, first was in Jan 2024) - Entered by patient    Dizziness     Stated Complaint: Ear Problem - vertigo (2nd episode ever, first was in Jan 2024)    Subjective:   HPI    43-year-old female here for evaluation of intermittent dizziness x 6 days.  She reports this is triggered usually when exercising and lying back on her back.  She reports feeling nausea on Saturday when this was most intense, taking a Zofran she had from previous similar incidents.  She reports worsening symptoms when she moves her head up and down so has been trying not to do this.  She has been doing a few head maneuvers she looked up online to help with vertigo which has helped for the most part and she is feeling better today.  She reports still feeling not 100% so came in for evaluation as she leaves out of [place] on [place].  She denies ear pain or tinnitus today but reports sometimes feeling a sharp pain that resolves quickly in her right ear.  She was seen by the immediate care and then emergency room in January for similar symptoms, had an MRI and blood work which were all negative.  She did not follow-up with primary care provider or ENT after this.  She has not had any vertigo in the past prior to January's episode.      Objective:   Past Medical History:    Hyperlipidemia              Past Surgical History:   Procedure Laterality Date    Removal of tonsils,14+ y/o  2000    Tonsillectomy      [place] teeth removed Bilateral 2002                Social History     Socioeconomic History    Marital status: Single   Tobacco Use    Smoking status: Never     Passive exposure: Never    Smokeless tobacco: Never   Vaping Use    Vaping status: Never Used   Substance and Sexual Activity    Alcohol use: Yes     Alcohol/week: 2.0 standard drinks of alcohol     Types: 2 Glasses of wine per week     Comment:  social    Drug use: Not Currently     Types: Cannabis              Review of Systems    Positive for stated complaint: Ear Problem - vertigo (2nd episode ever, first was in Jan 2024)  Other systems are as noted in HPI.  Constitutional and vital signs reviewed.      All other systems reviewed and negative except as noted above.    Physical Exam     ED Triage Vitals [06/05/24 1315]   /74   Pulse 85   Resp 22   Temp 98 °F (36.7 °C)   Temp src Temporal   SpO2 100 %   O2 Device None (Room air)       Current Vitals:   Vital Signs  BP: 140/74  Pulse: 85  Resp: 22  Temp: 98 °F (36.7 °C)  Temp src: Temporal    Oxygen Therapy  SpO2: 100 %  O2 Device: None (Room air)            Physical Exam  Vitals and nursing note reviewed.   Constitutional:       General: She is not in acute distress.     Appearance: Normal appearance. She is not ill-appearing, toxic-appearing or diaphoretic.   HENT:      Head: Normocephalic and atraumatic. No raccoon eyes, Del Castillo's sign, right periorbital erythema or left periorbital erythema.      Jaw: There is normal jaw occlusion.      Right Ear: Tympanic membrane, ear canal and external ear normal. No drainage, swelling or tenderness. No middle ear effusion. No mastoid tenderness. No hemotympanum. Tympanic membrane is not injected, perforated, erythematous or bulging.      Left Ear: Tympanic membrane, ear canal and external ear normal. No drainage, swelling or tenderness.  No middle ear effusion. No mastoid tenderness. No hemotympanum. Tympanic membrane is not injected, perforated, erythematous or bulging.      Nose: Nose normal. No congestion or rhinorrhea.      Mouth/Throat:      Lips: Pink.      Mouth: Mucous membranes are moist.      Pharynx: Oropharynx is clear. Uvula midline. No pharyngeal swelling, oropharyngeal exudate, posterior oropharyngeal erythema or uvula swelling.      Tonsils: No tonsillar exudate or tonsillar abscesses.   Eyes:      General: Lids are normal. No visual field  deficit or scleral icterus.        Right eye: No discharge.         Left eye: No discharge.      Extraocular Movements: Extraocular movements intact.      Right eye: Normal extraocular motion and no nystagmus.      Left eye: Normal extraocular motion and no nystagmus.      Conjunctiva/sclera: Conjunctivae normal.      Right eye: Right conjunctiva is not injected. No hemorrhage.     Left eye: Left conjunctiva is not injected. No hemorrhage.     Pupils: Pupils are equal, round, and reactive to light.   Cardiovascular:      Rate and Rhythm: Normal rate.      Pulses: Normal pulses.   Pulmonary:      Effort: Pulmonary effort is normal. No respiratory distress.      Breath sounds: Normal breath sounds and air entry.   Musculoskeletal:         General: Normal range of motion.      Cervical back: Full passive range of motion without pain, normal range of motion and neck supple. No rigidity, tenderness or bony tenderness. No spinous process tenderness or muscular tenderness.      Thoracic back: No bony tenderness.      Lumbar back: No bony tenderness.   Skin:     General: Skin is warm.      Findings: No rash.   Neurological:      Mental Status: She is alert and oriented to person, place, and time.      Cranial Nerves: No cranial nerve deficit, dysarthria or facial asymmetry.      Sensory: No sensory deficit.      Motor: No weakness or pronator drift.      Coordination: Coordination normal.      Gait: Gait normal.   Psychiatric:         Mood and Affect: Mood normal.         Behavior: Behavior normal.               ED Course   Labs Reviewed - No data to display                   MDM     43-year-old female here for evaluation of dizziness that has been on and off for the last 6 days.  She reports having a vertigo diagnosed in January, this is her second episode of similar symptoms.  Denies any neurologic symptoms otherwise no numbness or tingling, vision changes, slurred speech, difficulty finding words.    On exam patient  well-appearing in no distress lungs are clear with no wheezing stridor or crackles, bilateral TM normal, there is no swelling, erythema, blood in canals.  There is no drainage.  Nontender mastoid.  Full range of motion to jaw.  Good equal  bilaterally, sensation intact to arms legs and face equal bilaterally, no pronator drift noted, no slurred speech or facial droop noted.  Pulse PERRL intact EOM no nystagmus.    Differential diagnoses reflecting the complexity of care include but are not limited to benign positional vertigo, Ménière's disease, viral labyrinthitis.    Comorbidities that add complexity to management include: None  History obtained by an independent source was from: Patient  My independent interpretations of studies include: None performed  Shared decision making was done by: Patient and myself  Discussions of management was done with: Patient    Patient is well appearing, non-toxic and in no acute distress.  Vital signs are stable.   Writer called ENT for an appointment for patient.  Able to get patient into Dr. Bah Friday morning for reevaluation and determine if further tests or medications would be necessary.    Patient agrees with this plan.  Advised to take meclizine which she has at home from previous episode, up to 3 times a day as needed.  Patient reports not liking to take medications if it is just a Band-Aid and not to resolve it.  Discussed this may help for her symptoms in the meantime until she sees ENT for further investigation    Advised on p.o. fluids, decrease salt intake, slow steady movements, avoiding sudden change of position especially with head.  Discussed ER precautions given on discharge paperwork.  All questions answered. Return and ER precautions given.    Counseled: Patient, regarding diagnosis, regarding treatment plan, regarding diagnostic results, regarding prescription, I have discussed with the patient the results of tests, differential diagnosis, and warning  signs and symptoms that should prompt immediate return. The patient understands these instructions and agrees to the follow-up plan provided. There is no barriers to learning. Appropriate f/u given. Patient agrees to return for any concerns/ problems/complications.                                       Medical Decision Making      Disposition and Plan     Clinical Impression:  1. Benign paroxysmal positional vertigo, unspecified laterality         Disposition:  Discharge  6/5/2024  1:45 pm    Follow-up:  Swapnil Bah MD  [address] [place] [id number]  [phone]    Schedule an appointment as soon as possible for a visit in 2 days  7:20AM appt, arrive at 7am, bring ID and Insurance- Yellow parking LOT          Medications Prescribed:  Discharge Medication List as of 6/5/2024  1:46 PM

## 2024-06-07 ENCOUNTER — OFFICE VISIT (OUTPATIENT)
Dept: OTOLARYNGOLOGY | Facility: CLINIC | Age: 44
End: 2024-06-07
Payer: COMMERCIAL

## 2024-06-07 DIAGNOSIS — R42 DIZZINESS: Primary | ICD-10-CM

## 2024-06-07 NOTE — PROGRESS NOTES
Maria Luz Beasley is a 43 year old female.   Chief Complaint   Patient presents with    Vertigo     Pt c/o vertigo, no nausea or vomiting        ASSESSMENT AND PLAN:   1. Dizziness  43-year-old presents with recurrent vertigo.  She says that this has happened 2 or 3 times now and associated with certain movements such as when she was laying down for an exercise.  Her symptoms are currently resolved over the last 1 to 2 days.  She had an MRI scan in January of this year that had no acute intracranial abnormality except for a partially empty sella configuration    On exam she had a normal ear exam and no gross nystagmus was present    I gave her an order for physical therapy in case this happens again for her to call to schedule hopefully when she is acutely symptomatic so that they can test and possibly treat for possible BPPV based upon her history.  If she has a hard time getting in in a timely fashion she can contact our office to help coordinate the appointment.  She is agreeable to plan. Consult from Dr Barbour regarding dizziness    - PHYSICAL THERAPY - INTERNAL      The patient indicates understanding of these issues and agrees to the plan.      EXAM:   [facility] 05/22/2024 (Approximate)     Pertinent exam findings may also be noted above in assessment and plan     System Details   Skin Inspection - Normal.   Constitutional Overall appearance - Normal.   Head/Face Symmetric, TMJ tenderness not present    Eyes EOMI, PERRL   Right ear:  Canal clear, TM intact, no MIR   Left ear:  Canal clear, TM intact, no MIR   Nose: Septum midline, inferior turbinates not enlarged, nasal valves without collapse    Oral cavity/Oropharynx: No lesions or masses on inspection or palpation, tonsils symmetric    Neck: Soft without LAD, thyroid not enlarged  Voice clear/ no stridor   Other:      Scopes and Procedures:             Current Outpatient Medications   Medication Sig Dispense Refill    TRI-ESTARYLLA 0.18/0.215/0.25 MG-35 MCG  Oral Tab Take 1 tablet by mouth daily. 84 tablet 3    ergocalciferol 1.25 MG (81045 UT) Oral Cap Take 1 capsule (50,000 Units total) by mouth once a week. 24 capsule 0      Past Medical History:    Hyperlipidemia      Social History:  Social History     Socioeconomic History    Marital status: Single   Tobacco Use    Smoking status: Never     Passive exposure: Never    Smokeless tobacco: Never   Vaping Use    Vaping status: Never Used   Substance and Sexual Activity    Alcohol use: Yes     Alcohol/week: 2.0 standard drinks of alcohol     Types: 2 Glasses of wine per week     Comment: social    Drug use: Not Currently     Types: Cannabis          Swapnil Bah MD  6/7/2024  8:09 AM

## 2024-09-15 DIAGNOSIS — Z01.419 ENCOUNTER FOR GYNECOLOGICAL EXAMINATION WITHOUT ABNORMAL FINDING: ICD-10-CM

## 2024-09-16 RX ORDER — NORGESTIMATE AND ETHINYL ESTRADIOL 7DAYSX3 28
1 KIT ORAL DAILY
Qty: 84 TABLET | Refills: 1 | Status: SHIPPED | OUTPATIENT
Start: 2024-09-16

## 2025-01-17 ENCOUNTER — OFFICE VISIT (OUTPATIENT)
Dept: OBGYN CLINIC | Facility: CLINIC | Age: 45
End: 2025-01-17
Payer: COMMERCIAL

## 2025-01-17 VITALS
WEIGHT: 134.81 LBS | BODY MASS INDEX: 23.02 KG/M2 | HEART RATE: 76 BPM | DIASTOLIC BLOOD PRESSURE: 78 MMHG | HEIGHT: 64 IN | SYSTOLIC BLOOD PRESSURE: 112 MMHG

## 2025-01-17 DIAGNOSIS — Z01.419 ENCOUNTER FOR GYNECOLOGICAL EXAMINATION WITHOUT ABNORMAL FINDING: Primary | ICD-10-CM

## 2025-01-17 PROCEDURE — 99396 PREV VISIT EST AGE 40-64: CPT | Performed by: ADVANCED PRACTICE MIDWIFE

## 2025-01-17 PROCEDURE — 3008F BODY MASS INDEX DOCD: CPT | Performed by: ADVANCED PRACTICE MIDWIFE

## 2025-01-17 PROCEDURE — 3078F DIAST BP <80 MM HG: CPT | Performed by: ADVANCED PRACTICE MIDWIFE

## 2025-01-17 PROCEDURE — 3074F SYST BP LT 130 MM HG: CPT | Performed by: ADVANCED PRACTICE MIDWIFE

## 2025-01-17 RX ORDER — NORGESTIMATE AND ETHINYL ESTRADIOL 7DAYSX3 28
1 KIT ORAL DAILY
Qty: 84 TABLET | Refills: 3 | Status: SHIPPED | OUTPATIENT
Start: 2025-01-17

## 2025-01-17 NOTE — PATIENT INSTRUCTIONS
Why Have a Pap Test?  Early on, cervical changes don't cause symptoms. Often, the only way to know you have cervical changes is to do a Pap test. A Pap test can find these problems early, when they are easier to treat. Pap tests can also detect some infections of the cervix and vagina.  What is a Pap test?  A Pap test is a procedure that helps find changes in the cervix that may lead to cancer. The cervix is the part of the uterus that opens into the vagina. For this test, a small sample of cells is taken from the cervix. This is done in your healthcare provider’s office. The cells are then analyzed in a lab. A Pap test is a safe procedure. It takes just a few minutes and causes little or no discomfort.  The HPV connection  Human papillomavirus or HPV is a family of viruses that spread through skin contact. Certain types are almost always spread through sexual contact. Some HPV types cause genital warts (condyloma). But not all types of HPV cause visible symptoms. Certain types cause cell changes (dysplasia) in the cervix that can lead to cancer. In fact, HPV infection is the most important risk factor for cervical cancer. Healthcare providers can now test for HPV. Testing for HPV is often done with the Pap test. That’s why it’s important to have Pap tests as recommended by your healthcare provider. This helps ensure that any abnormal cells will be found and treated before they become cancerous.  Who should have a Pap test and HPV test?  Ask your healthcare provider when to start having Pap tests, whether you should have an HPV test done at the same time, and how often to have them. Follow these guidelines from the American Cancer Society for cervical cancer screening:  A first Pap test at age 21. And then every 3 years until age 29, HPV testing is not recommended during this time, though it may be done to follow-up on an abnormal Pap test.  Starting at age 30, the preferred testing is a Pap test done with an HPV  test every 5 years. This should be done until age 65. Another option for women in this 30 to 65 age group is to have just the Pap test done every 3 years.  You may need a different screening schedule if you are at high risk for cervical cancer. Risk factors include having HIV, a weak immune system, or exposure to the medicine RICARDA while your mother was pregnant with you. Talk with your healthcare provider about the best schedule for you.  If you’re over 65 and have had regular screenings for the last 10 years with no abnormal results in the last 20 years, you may stop cervical cancer screening.  If you had a hysterectomy that included removing your cervix, you can stop screening unless the hysterectomy was done to treat cervical cancer or pre-cancer. If you still have your cervix after the hysterectomy, you should continue screening according to the above guidelines.  Routine testing does not need to be done each year. However, if your test is abnormal, your provider will let you know how often to be tested.   Women who have been vaccinated for HPV should still follow these guidelines.  If you have had cervical cancer, talk to your healthcare provider about the screening plan that's best for you.  [employer] last reviewed this educational content on 9/1/2017 © 2000-2020 The [employer]. [address] [place] [address], [place] [address]. All rights reserved. This information is not intended as a substitute for professional medical care. Always follow your healthcare professional's instructions.        Breast Health: Breast Self-Awareness  What is breast self-awareness?  Breast self-awareness is knowing how your breasts normally look and feel. Your breasts change as you go through different stages of your life. So it’s important to learn what is normal for your breasts. Knowing about your breasts helps you spot any changes in them right away. Tell your healthcare provider about any changes.   Why is breast  self-awareness important?   Many experts now say that women should focus on breast self-awareness instead of doing a breast self-examination (BSE). These experts include the American Cancer Society and the American Congress of Obstetricians and Gynecologists. Some experts even advise not teaching women to do a BSE. That’s because research hasn’t shown a clear benefit to doing BSEs.   Breast self-awareness is different than a BSE. It isn’t about following a certain method and schedule. It’s about knowing what's normal for your breasts. That way you can spot even small changes right away. If you see any changes, tell your healthcare provider.   Changes to look for  Call your healthcare provider if you find any changes in your breasts that worry you. These changes may be:   A lump  Nipple discharge other than breast milk, especially if it's bloody  Swelling  A change in size or shape  Skin changes, such as redness, thickening, or dimpling of the skin  Swollen lymph nodes in the armpit  Nipple problems, such as pain or redness  If you find a lump  Call your provider if you find lumpiness in one breast. Also call if you feel something different in the tissue or feel a definite lump. Sometimes lumpiness may be due to menstrual changes. But there may be reason for concern.   Your provider may want to see you right away if you have:   Nipple discharge that is bloody  Skin changes on your breast, such as dimpling or puckering  It’s okay to be upset if you find a lump. Be sure to call your provider right away. Remember that most breast lumps are benign. This means they are not cancer.   [employer] last reviewed this educational content on 5/1/2020 © 2000-2020 The [email or web address]. [address] [place] [address], [place] [address]. All rights reserved. This information is not intended as a substitute for professional medical care. Always follow your healthcare professional's instructions.        Understanding STIs    When it comes  to sex, nothing is risk-free. Any sexual contact with the penis, vagina, anus, or mouth can spread a sexually transmitted infection (STI). These include chlamydia, gonorrhea, herpes, HIV, and genital warts. STIs are also known as sexually transmitted diseases (STDs). The only sure way to prevent STIs is not having sex (abstinence). But there are ways to make sex safer. Use a latex condom each time you have sex. And choose your partner wisely.  Use condoms for safer sex  If you have sex, latex condoms provide the best protection against STIs. Latex condoms stop the exchange of body fluids that carry certain STIs. They also limit contact with affected skin. Be aware that a condom doesn’t cover all skin. So affected skin that isn't covered can still transfer disease. But you’re safer with a condom than without one. Use a condom even if you use other birth control. Birth control methods such as the pill or IUD help prevent pregnancy, but they don't protect against STIs.  Choose the right condom  Condoms made of latex prevent disease best. If you’re allergic to latex, use polyurethane condoms instead. Male condoms fit over the penis. Female condoms line the vagina. Before buying a condom, read the label to be sure it prevents disease. Some novelty condoms don’t.  The right lubricant helps  Buy lubricated condoms or use lubricant. This provides greater comfort and reduces the risk for condom breakage. Use only water-based lubricants. Don’t use oil, lotion, or petroleum jelly. They can weaken the condom, causing breakage. Also, you may want to choose lubricants without nonoxynol-9. This spermicide may cause irritation. It can raise the risk for certain STIs.  Use condoms correctly  For condoms to work, they must be used the right way. Keep these tips in mind:  Use a new latex condom each time you have sex. Slip the condom on the penis before any contact is made.  When ready to withdraw, hold the rim of the condom as the  penis pulls out. This prevents the condom from slipping off.  Check the expiration date before using a condom.  Don’t store condoms in places that can get hot, such as a car or a wallet that is carried in a back pocket.  Get to know your partner  Safer sex is a process. It involves getting to know your partner and making informed choices. Ask each other how many partners you have had in the past, and how many you have now. Find out if either of you has HIV or any other STI. If you decide to have sex, use a condom each time. Don’t stop using condoms unless you’re sure neither of you has other partners and you’ve both been tested to confirm you don’t have HIV or other STIs. Then stay free of disease by having sex only with each other (monogamy).  Keep your cool  Don’t let alcohol or drugs cloud your judgment. They could lead you to have sex with someone you wouldn’t have chosen if you were sober. Or you might forget to use a condom. If you do plan to have sex, keep a latex condom with you. Don’t wait until you’re in the heat of passion to try to find one.  Consider abstinence  The only way to be sure you won’t get an STI is to abstain from sex. Abstinence is a choice that many people make at some point in their life. Maybe you want to wait until you are sure you’re ready before you have sex. Maybe you’d like a break from the responsibilities of sex for a while. Or maybe you just want to know your partner better before taking the next step. Abstinence is a choice you can make now to protect your future.  [employer] last reviewed this educational content on 12/1/2018 © 2000-2020 The [employer], [employer]. [address] [place] [address], [place] [address]. All rights reserved. This information is not intended as a substitute for professional medical care. Always follow your healthcare professional's instructions.        Understanding Body Mass Index (BMI)   Body mass index (BMI) is a way to figure out your weight category. It  uses the ratio of your height to your weight. The BMI is a measure of your weight that is corrected for height. Knowing your BMI is a way to tell if you are at a healthy weight, are underweight, or overweight. The higher your BMI, the greater your risk for weight-related health problems.  What BMI means for adults  BMI below 18.5: Underweight  BMI 18.5 to 24.9: Healthy weight or ideal body weight   BMI 25 to 29.9: Overweight  BMI 30 and over: Obese  BMI 40 and over: Severe obesity        Find your BMI with an online BMI calculator tool, such as these from the CDC:  BMI calculator for adults  BMI calculator for children and teens  Using the BMI chart  To figure out your BMI, find your height and weight (or the numbers closest to them) on the table below. Follow each column of numbers to where your height and weight meet on the table. That is your BMI.    [employer] last reviewed this educational content on 9/1/2019  © 5311-5340 The [employer]. [address] [place] [address], [place] [address]. All rights reserved. This information is not intended as a substitute for professional medical care. Always follow your healthcare professional's instructions.        Obesity and Its Impact on Health  Obesity is a major health problem in the [place] and all over the world. It affects nearly 2 out of 5 [place] adults.  What is obesity?  Obesity is a term used to describe a body weight that is above a normal weight for a specific height. Obesity is measured by using BMI (body mass index). BMI is a formula that uses a person’s weight divided by their height. BMI may be used to screen for weight problems but it’s important to know that BMI does not diagnose health problems or a person’s body fat. A high BMI number can mean high body fat. A BMI between 18.5 and 25 is normal. A BMI between 25 and 30 falls within the overweight range. A BMI 30 or higher is within the obese range. A BMI of 40 or more is considered extreme or severe  obesity.  Why is obesity a problem?  Carrying too much weight can increase your risk for many serious health issues. These include problems with your heart, lungs, blood vessels, brain, and joints. Some of the problems that can happen include:  Heart and circulation problems. These include heart disease, high blood pressure, heart rhythm problems (atrial fibrillation), and stroke  Type 2 diabetes  Certain cancers, such as colon and breast cancer  Sleep apnea and other breathing problems.  Back or joint problems, such as osteoarthritis and gout  Digestive tract problems such as gallstones and GERD (gastroesophageal reflux disease)  Depression (with severe obesity)  Carrying too much weight can also affect your quality of life. And it can keep you from doing things you want or need to do.  How can you lower your risk for problems?  The key to lowering your risk for health problems from obesity is to manage your weight. If you are overweight or obese, the first step is to lose weight. Studies show that losing as little as 5 percent of your body weight is good for your health.  An important part of losing weight involves developing health habits and behaviors. Here are some tips:  Control how much you eat. Food is your body’s way to get energy (calories). But if you take in more calories than your body uses, you’ll gain weight. Know your eating habits and keep your portions reasonable.  Make healthy eating choices. Choose foods with many nutrients that give your body the energy it needs without adding extra pounds (kilograms). Also limit foods with added sugar and fats.  Be more active. Exercise burns calories, which can help you manage your weight. Increase your daily movement, add aerobic activity, and include strength training.  Talk with your healthcare provider. Ask about working with a dietitian, health , exercise physiologist, or mental health provider who can support and encourage you.     Adding exercise  to your day can help you control your weight.        If you have trouble losing weight, your healthcare provider may suggest medicines to help you. Weight loss (bariatric) surgery may also be an option for adults who have:  A BMI of 40 or more, or who are more than 100 pounds (45.4 kg) overweight  A BMI of 35 to less than 40 and a serious health problem such as type 2 diabetes, high blood pressure, or sleep apnea.  Not been able to stay at a healthy weight for a period of time in spite of efforts to lose weight through diet, exercise, or medicines  StayWell last reviewed this educational content on 3/1/2020  © 5124-9806 The [employer]. [address] [place] [address], [place] [address]. All rights reserved. This information is not intended as a substitute for professional medical care. Always follow your healthcare professional's instructions.        Exercise: Making the Most of Your Time  Your exercise goal is 30 minutes on most days. Aim for a total of 150 or more minutes a week. Not sure you can fit one 30-minute block of exercise time into your day? Split it up into shorter 10- and 15-minute blocks. Do this 2 to 3 times a day. You'll still get all of the benefits of exercise.    Use your whole body  Aerobic exercise works your heart and lungs. Some examples are walking, running, and cycling. Try adding a few other activities, too. This can help you strengthen and stretch many different muscles in your body.  Strengthen your:  Lower legs. Go up and down slowly on your toes, while you’re filing papers or washing dishes.  Upper legs. Lower yourself slowly into a chair without using your arms.  Stretch your:  Back. After you get up from a chair, place your palms on your low back and lean your upper body back.  Shoulders and chest. Lift your arms overhead and reach tall while waiting for your computer to warm up.  Lower legs. Raise your toes and press them against a wall (with your heel on the ground).  Find a few  extra minutes  Try these tips to add some extra exercise and activity to your day:  At work. Pick a lunch spot a few blocks away and walk there and back. Take a brisk walk on your break.  At home. Ride bikes with your kids. Use an exercise bike in the living room while you watch TV.  On errands. Park a few blocks away from where you need to go and walk there. Power-walk in malls by doing a fast lap or two before shopping.  At play. Go hiking with friends instead of sitting on a bench. Walk through a street fair instead of sitting in a movie.  Tips for sticking with it  Plan your activity by writing it on a calendar.  Find a uyen at work to walk with during a lunch break.  Take your kids with you on a short walk after dinner.  Post a reminder list of the benefits of being active where you can see it.  Set an alarm to tell you when it’s time for an activity break.   [employer] last reviewed this educational content on 3/1/2018  © 7537-0263 [employer]. [address] [place] [address]. All rights reserved. This information is not intended as a substitute for professional medical care. Always follow your healthcare professional's instructions.        4 Steps for Eating Healthier  Changing the way you eat can improve your health. It can lower your cholesterol and blood pressure, and help you stay at a healthy weight. Your diet doesn’t have to be bland and boring to be healthy. Just watch your calories and follow these steps:    Step 1. Eat fewer unhealthy fats  Choose more fish and lean meats instead of fatty cuts of meat.  Skip butter and lard, and use less margarine.  Pass on foods that have palm, coconut, or hydrogenated oils.  Eat fewer high-fat dairy foods like cheese, ice cream, and whole milk.  Get a heart-healthy cookbook and try some low-fat recipes.    Step 2. Go light on salt  Keep the saltshaker off the table.  Limit high-salt ingredients, such as soy sauce, bouillon, and garlic  salt.  Instead of adding salt when cooking, season your food with herbs and flavorings. Try lemon, garlic, and onion, or salt-free herb seasonings.  Limit convenience foods, such as boxed or canned foods and restaurant food.  Read food labels and choose lower-sodium options.    Step 3. Limit sugar  Pause before you add sugars to pancakes, cereal, coffee, or tea. This includes white and brown table sugar, syrup, honey, and molasses. Cut your usual amount by half.  Use non-sugar sweeteners. Stevia, aspartame, and sucralose can satisfy a sweet tooth without adding calories.  Swap out sugar-filled soda and other drinks. Buy sugar-free or low-calorie beverages. Remember water is always the best choice.  Read labels and choose foods with less added sugar. Keep in mind that dairy foods and foods with fruit will have some natural sugar.  Cut the sugar in recipes by 1/3 to 1/2. Boost the flavor with extracts like almond, vanilla, or orange. Or add spices such as cinnamon or nutmeg.    Step 4. Eat more fiber  Eat fresh fruits and vegetables every day.  Boost your diet with whole grains. Go for oats, whole-grain rice, and bran.  Add beans and lentils to your meals.  Drink more water to match your fiber increase to help prevent constipation.    [employer] last reviewed this educational content on 6/1/2017  © 6584-6216 The [employer]. [address] [place] [address], [place] [address]. All rights reserved. This information is not intended as a substitute for professional medical care. Always follow your healthcare professional's instructions.        Eating Healthy on the Run     Many grocery stores stock pre-made salads for eating on the run.     Need to eat on the run? This often means grabbing \"junk\" or fast food full of fat, salt, sugar, and cholesterol. But being in a rush doesn't mean that you can't eat healthy.  \"Fast\" food made healthy  Try these ways to get good nutrition, fast.  Go to a grocery or convenience market  instead of a fast-food restaurant. Look for choices like sandwiches, yogurt, fresh fruit, and juices.  Buy precut, prepackaged fresh or frozen fruits and vegetables. You can use them for a snack, salad, smoothie, or stir-coleman.  Microwave a frozen dinner that has less than 15 grams (g) of fat and less than 800 milligrams (mg) of sodium. Complete the meal with a whole-grain roll, vegetables, and fresh fruit.  If you must have fast food, consider your options. Go for veggie burgers, broiled and skinless chicken breast sandwiches, or dinner salads with low-fat dressing. Avoid large (super-sized) portions.  Blot the extra oil from food with a napkin before you eat it.  Instead of french fries, choose a baked potato with salsa.  Tip  Fast-food restaurants often have printed nutrition information available. Ask for this information and look up your favorite items before you order.   [employer] last reviewed this educational content on 6/1/2017 © 2000-2020 The [employer]. [address] [place] [address]. All rights reserved. This information is not intended as a substitute for professional medical care. Always follow your healthcare professional's instructions.        Eating Healthy: Good Nutrition Quiz  To test your nutrition knowledge, answer the questions below as either True or False.     True False    []  []  1. There are many non-dairy sources of calcium.   []  []  2. Low-fat foods are always better.   []  []  3. Fiber isn’t important.   []  []  4. You need to watch portion sizes only when eating at home.   []  []  5. The outside aisles of the grocery store are the best places to shop.       Answers  TRUE. While dairy products have the most calcium, you can also get this mineral from other foods, too. Try calcium-fortified juices, cereals, breads, rice milk, or almond milk. Other sources of calcium are canned fish, some beans, and dark, leafy greens. Soybeans and some soy items are also good  options. These include soy yogurt, tempeh, and tofu made with calcium sulfate.  FALSE. Just because a food is low-fat or fat-free does not mean it’s always a better nutritional choice. For instance, fat-free cookies have the same amount of sugar and calories, or often even more, than regular cookies. Read labels.  FALSE. High-fiber foods help keep your digestive system healthy. Try to get 25 to 38 grams of fiber a day. Also, remember to drink plenty of water to prevent constipation.  FALSE. Portion control is just as important when you’re eating out. Many restaurants serve extra-large portions. So split your entree with someone at the table. Or ask for a take-home box. Then put half of your entree in it right away, before you start eating.  TRUE. This is where the fresh foods tend to be. These include fruits, vegetables, grains, and dairy. Processed foods are more likely to be on the inside aisles. When shopping these aisles, read labels extra carefully.  [employer] last reviewed this educational content on 6/1/2017  © 5004-6719 [employer]. [address] [place] [address]. All rights reserved. This information is not intended as a substitute for professional medical care. Always follow your healthcare professional's instructions. Prevention Guidelines, Women Ages 40 to 49  Screening tests and vaccines are an important part of managing your health. A screening test is done to find diseases in people who don't have any symptoms. The goal is to find a disease early so lifestyle changes and checkups can reduce the risk of disease. Or the goal may be to detect it early to treat it most effectively. Screening tests are not used to diagnose a disease. But they are used to see if more testing is needed. Health counseling is important, too. Below are guidelines for these, for women ages 40 to 49. Talk with your healthcare provider to make sure you’re up-to-date on what you need.  Screening Who needs it  How often   Type 2 diabetes or prediabetes All women beginning at age 45 and women without symptoms at any age who are overweight or obese and have 1 or more additional risk factors for diabetes At least every 3 years1   Type 2 diabetes or prediabetes All women diagnosed with gestational diabetes Lifelong testing every 3 years   Type 2 diabetes All women with prediabetes Every year   Alcohol misuse All women in this age group At routine exams   Blood pressure All women in this age group Yearly checkup if your blood pressure is normal  Normal blood pressure is less than 120/80 mm Hg  If your blood pressure reading is higher than normal, follow the advice of your healthcare provider   Breast cancer All women at average risk in this age group Screening with a mammogram can start at age 40.2 Talk with your healthcare provider to help you decide when to start screening. At age 45 start yearly mammograms.3   Cervical cancer All women in this age group, except women who have had a complete hysterectomy Pap test every 3 years or Pap test plus human papilloma virus (HPV) test every 5 years   Colorectal cancer Women age 45 years and older at average risk Multiple tests are available and are used at different times. Possible tests include:  Flexible sigmoidoscopy every 5 years, or  Colonoscopy every 10 years, or  CT colonography (virtual colonoscopy) every 5 years, or  Yearly fecal occult blood test, or  Yearly fecal immunochemical test every year, or  Stool DNA test, every 3 years  If you choose a test other than a colonoscopy and have an abnormal test result, you will need to follow-up with a colonoscopy. Screening advice varies among expert groups. Talk with your healthcare provider about which tests are best for you.  Some people should be screened using a different schedule because of their personal or family health history. Talk with your healthcare provider about your health history.   Chlamydia Women at increased risk  for infection At routine exams if you're at risk or have symptoms   Depression All women in this age group At routine exams   Gonorrhea Sexually active women at increased risk for infection At routine exams   Hepatitis C Anyone at increased risk; 1 time for those born between 1945 and 1965 At routine exams   High cholesterol or triglycerides All women ages 45 and older who are at risk for coronary artery disease; younger women, talk with your healthcare provider At least every 5 years   HIV All women At routine exams. Those with risk factors for HIV should be tested at least annually.   Obesity All women in this age group At routine exams   Syphilis Women at increased risk for infection-talk with your healthcare provider At routine exams   Tuberculosis Women at increased risk for infection-talk with your healthcare provider Ask your healthcare provider   Vision All women in this age group Complete exam at age 40 and eye exams every 2 to 4 years. If you have a chronic disease, ask your healthcare provider how often you should have your eyes examined.4   Vaccine Who needs it How often   Chickenpox (varicella) All women in this age group who have no record of this infection or vaccine 2 doses; the second dose should be given at least 4 weeks after the first dose   Hepatitis A Women at increased risk for infection-talk with your healthcare provider 2 doses given 6 months apart   Hepatitis B Women at increased risk for infection-talk with your healthcare provider 3 doses over 6 months; second dose should be given 1 month after the first dose; the third dose should be given at least 2 months after the second dose and at least 4 months after the first dose   Haemophilus influenzae Type B (HIB) Women at increased risk 1 to 3 doses   Influenza (flu) All women in this age group Once a year   Measles, mumps, rubella (MMR) All women in this age group who have no record of these infections or vaccines 1 or 2 doses    Meningococcal Women at increased risk for infection-talk with your healthcare provider 1 or more doses   Pneumococcal conjugate vaccine (PCV13) and pneumococcal polysaccharide vaccine (PPSV23) Women at increased risk for infection-talk with your healthcare provider 1 or 2 doses   Tetanus/diphtheria/pertussis (Td/Tdap) booster All women in this age group A 1-time dose of Tdap instead of a Td booster after age 18, then Td every 10 years   Counseling Who needs it How often   BRCA gene mutation testing for breast and ovarian cancer susceptibility Women with increased risk for having gene mutation When your risk is known   Breast cancer and chemoprevention Women at high risk for breast cancer When your risk is known   Diet and exercise Women who are overweight or obese When diagnosed, and then at routine exams   Domestic violence Women at the age in which they are able to have children At routine exams   Sexually transmitted infection prevention Women at increased risk for infection-talk with your healthcare provider At routine exams   Use of tobacco and the health effects it can cause All women in this age group Every exam   1 American Diabetes Association  2 American College of Obstetricians and Gynecologists   3 American Cancer Society  4 American Academy of Ophthalmology  [employer] last reviewed this educational content on 11/1/2017  © 6367-1940 [employer]. All rights reserved. This information is not intended as a substitute for professional medical care. Always follow your healthcare professional's instructions.

## 2025-01-17 NOTE — PROGRESS NOTES
Chief Complaint   Patient presents with    Gyn Exam     Pt is here for a gyn exam         HPI:   Maria Luz is 44 year old , here today for routine annual gyn exam.     Tobacco use: denies  Contraception: OCPs; happy with method no side effects. No risks for VTE or migraines with aura. Requests refill  Routine STI screening: declines    Menarche: 15  Cycles: 28-30 days  LMP: 26  Family history of breast, ovarian, or uterine cancer: denies  Significant Gyn history: none  HPV vaccine status: completed    Breast Cancer risk assessment score 10.3%  Jos[place] risk assessment calculator used.     Note: This is a gyn-only visit. Pt has PCP and is referred back to PCP for care of any non-gyn concerns listed above in the Problem List or, if does not have a PCP was referred to one today.    Pt offered for MA to be present during exam and patient declines.      HISTORY:  Past Medical History:    Hyperlipidemia      Hx Prior Abnormal Pap: Yes  Pap Date: 21  Pap Result Notes: Negative    Past Surgical History:   Procedure Laterality Date    Removal of tonsils,14+ y/o      Tonsillectomy      Gormania teeth removed Bilateral       Family History   Problem Relation Age of Onset    Dementia Maternal Grandmother     Prostate Cancer Maternal Grandfather       Social History:   Social History     Socioeconomic History    Marital status: Single   Tobacco Use    Smoking status: Never     Passive exposure: Never    Smokeless tobacco: Never   Vaping Use    Vaping status: Never Used   Substance and Sexual Activity    Alcohol use: Yes     Alcohol/week: 2.0 standard drinks of alcohol     Types: 2 Glasses of wine per week     Comment: social    Drug use: Not Currently     Types: Cannabis    Sexual activity: Yes     Partners: Male       Safe relationship/safe home environment      Depression Screening (PHQ-2/PHQ-9): Over the LAST 2 WEEKS   Little interest or pleasure in doing things (over the last two weeks)?: Not at  all  Little interest or pleasure in doing things: Not at all    Feeling down, depressed, or hopeless (over the last two weeks)?: Not at all  Feeling down, depressed, or hopeless: Not at all    PHQ-2 SCORE: 0  PHQ-2 SCORE: 0        Immunization History   Administered Date(s) Administered    Covid-19 Vaccine Amorelie (J&J) 0.5ml 04/07/2021    Covid-19 Vaccine Pfizer 30 mcg/0.3 ml 01/03/2022    Hpv Virus Vaccine 9 Elsa Im 11/01/2022, 01/04/2023, 12/27/2023    TDAP 03/03/2024        Medications (Active prior to today's visit):  Current Outpatient Medications   Medication Sig Dispense Refill    TRI-ESTARYLLA 0.18/0.215/0.25 MG-35 MCG Oral Tab Take 1 tablet by mouth daily. Due for yearly annual in December. 84 tablet 3    ergocalciferol 1.25 MG (34710 UT) Oral Cap Take 1 capsule (50,000 Units total) by mouth once a week. 24 capsule 0       Allergies:  Allergies[1]    ROS:  Review of Systems   Constitutional:  Negative for chills and fever.   Genitourinary: Negative.  Negative for menstrual problem and pelvic pain.       PHYSICAL EXAM:  Vitals:    01/17/25 0942   BP: 112/78   Pulse: 76     Physical Exam  Vitals reviewed.   Constitutional:       Appearance: Normal appearance.   HENT:      Head: Normocephalic.   Neck:      Thyroid: No thyroid mass, thyromegaly or thyroid tenderness.   Cardiovascular:      Heart sounds: Normal heart sounds.   Pulmonary:      Effort: Pulmonary effort is normal.      Breath sounds: Normal breath sounds.   Chest:   Breasts:     Right: No inverted nipple, mass, skin change or tenderness.      Left: No inverted nipple, mass, skin change or tenderness.   Genitourinary:     Comments: Pelvic exam deferred. No contributory history  Neurological:      Mental Status: She is alert and oriented to person, place, and time.   Psychiatric:         Behavior: Behavior normal.         Thought Content: Thought content normal.         Judgment: Judgment normal.       Last pap 8/2021 NILM HPV negative next due  8/2026  Last mammogram 2/2024 WNL, due  HPV vaccination status UTD     ASSESSMENT/PLAN:     Maria Luz was seen today for gyn exam.    Diagnoses and all orders for this visit:    Encounter for gynecological examination without abnormal finding  -     TRI-ESTARYLLA 0.18/0.215/0.25 MG-35 MCG Oral Tab; Take 1 tablet by mouth daily. Due for yearly annual in December.  -     [facility] LILLIAN 2D+3D SCREENING BILAT (CPT=77067/55841); Future       Next routine annual gyn exam due: 1 year   Follow-up/Return to clinic: for annual or PRN    Counseling:   Best hygiene practices  Recommendations and rationale for flu shots  Frequency of health screening and personal risks  Pap, SBE/CBE, mammography  Benefits of daily exercise    Patient verbalized understanding, All questions answered. No barriers to learning identified    REMEDIOS Amaya under direct supervision of Johanna Samayoa CNM    Patient seen in conjunction with CLIFF. I personally witnessed the patient's exam and medical decision making on this date of service.  I was physically present in the room for the performance of the E/M service.  I have reviewed the IVANNA student's documentation and findings including history, Exam, and Medical Decision Making, edited the document for accuracy and verify that it represents the clinical findings and services performed.           [1] No Known Allergies

## 2025-02-01 DIAGNOSIS — Z01.419 ENCOUNTER FOR GYNECOLOGICAL EXAMINATION WITHOUT ABNORMAL FINDING: ICD-10-CM

## 2025-02-03 RX ORDER — NORGESTIMATE AND ETHINYL ESTRADIOL 7DAYSX3 28
KIT ORAL
Qty: 84 TABLET | Refills: 3 | OUTPATIENT
Start: 2025-02-03

## 2025-03-05 ENCOUNTER — HOSPITAL ENCOUNTER (OUTPATIENT)
Dept: MAMMOGRAPHY | Facility: HOSPITAL | Age: 45
Discharge: HOME | End: 2025-03-05
Attending: ADVANCED PRACTICE MIDWIFE
Payer: COMMERCIAL

## 2025-03-05 DIAGNOSIS — Z01.419 ENCOUNTER FOR GYNECOLOGICAL EXAMINATION WITHOUT ABNORMAL FINDING: ICD-10-CM

## 2025-03-05 PROCEDURE — 77067 SCR MAMMO BI INCL CAD: CPT | Performed by: ADVANCED PRACTICE MIDWIFE

## 2025-03-05 PROCEDURE — 77063 BREAST TOMOSYNTHESIS BI: CPT | Performed by: ADVANCED PRACTICE MIDWIFE

## 2025-03-13 ENCOUNTER — HOSPITAL ENCOUNTER (OUTPATIENT)
Dept: MAMMOGRAPHY | Facility: HOSPITAL | Age: 45
Discharge: HOME | End: 2025-03-13
Attending: ADVANCED PRACTICE MIDWIFE
Payer: COMMERCIAL

## 2025-03-13 ENCOUNTER — HOSPITAL ENCOUNTER (OUTPATIENT)
Dept: ULTRASOUND IMAGING | Facility: HOSPITAL | Age: 45
Discharge: HOME | End: 2025-03-13
Attending: ADVANCED PRACTICE MIDWIFE
Payer: COMMERCIAL

## 2025-03-13 DIAGNOSIS — R92.8 ABNORMAL MAMMOGRAM: ICD-10-CM

## 2025-03-13 PROCEDURE — 76642 ULTRASOUND BREAST LIMITED: CPT | Performed by: ADVANCED PRACTICE MIDWIFE

## 2025-03-13 PROCEDURE — 77061 BREAST TOMOSYNTHESIS UNI: CPT | Performed by: ADVANCED PRACTICE MIDWIFE

## 2025-03-13 PROCEDURE — 77065 DX MAMMO INCL CAD UNI: CPT | Performed by: ADVANCED PRACTICE MIDWIFE

## 2025-04-04 ENCOUNTER — LAB ENCOUNTER (OUTPATIENT)
Dept: LAB | Age: 45
End: 2025-04-04
Attending: STUDENT IN AN ORGANIZED HEALTH CARE EDUCATION/TRAINING PROGRAM
Payer: COMMERCIAL

## 2025-04-04 ENCOUNTER — OFFICE VISIT (OUTPATIENT)
Dept: FAMILY MEDICINE CLINIC | Facility: CLINIC | Age: 45
End: 2025-04-04

## 2025-04-04 VITALS
HEIGHT: 64 IN | HEART RATE: 82 BPM | TEMPERATURE: 98 F | DIASTOLIC BLOOD PRESSURE: 69 MMHG | WEIGHT: 137 LBS | OXYGEN SATURATION: 98 % | SYSTOLIC BLOOD PRESSURE: 105 MMHG | BODY MASS INDEX: 23.39 KG/M2

## 2025-04-04 DIAGNOSIS — Z00.00 WELL ADULT EXAM: ICD-10-CM

## 2025-04-04 DIAGNOSIS — Z00.00 WELL ADULT EXAM: Primary | ICD-10-CM

## 2025-04-04 DIAGNOSIS — E55.9 VITAMIN D DEFICIENCY: ICD-10-CM

## 2025-04-04 LAB
ALBUMIN SERPL-MCNC: 4.3 G/DL (ref 3.2–4.8)
ALBUMIN/GLOB SERPL: 1.5 {RATIO} (ref 1–2)
ALP LIVER SERPL-CCNC: 35 U/L
ALT SERPL-CCNC: 12 U/L
ANION GAP SERPL CALC-SCNC: 9 MMOL/L (ref 0–18)
AST SERPL-CCNC: 20 U/L (ref ?–34)
BILIRUB SERPL-MCNC: 0.4 MG/DL (ref 0.3–1.2)
BUN BLD-MCNC: 17 MG/DL (ref 9–23)
BUN/CREAT SERPL: 18.1 (ref 10–20)
CALCIUM BLD-MCNC: 9 MG/DL (ref 8.7–10.4)
CHLORIDE SERPL-SCNC: 105 MMOL/L (ref 98–112)
CHOLEST SERPL-MCNC: 284 MG/DL (ref ?–200)
CO2 SERPL-SCNC: 24 MMOL/L (ref 21–32)
CREAT BLD-MCNC: 0.94 MG/DL
DEPRECATED RDW RBC AUTO: 43.2 FL (ref 35.1–46.3)
EGFRCR SERPLBLD CKD-EPI 2021: 78 ML/MIN/1.73M2 (ref 60–?)
ERYTHROCYTE [DISTWIDTH] IN BLOOD BY AUTOMATED COUNT: 12.4 % (ref 11–15)
EST. AVERAGE GLUCOSE BLD GHB EST-MCNC: 108 MG/DL (ref 68–126)
FASTING PATIENT LIPID ANSWER: YES
FASTING STATUS PATIENT QL REPORTED: YES
GLOBULIN PLAS-MCNC: 2.8 G/DL (ref 2–3.5)
GLUCOSE BLD-MCNC: 92 MG/DL (ref 70–99)
HBA1C MFR BLD: 5.4 % (ref ?–5.7)
HCT VFR BLD AUTO: 36.5 %
HDLC SERPL-MCNC: 101 MG/DL (ref 40–59)
HGB BLD-MCNC: 12.6 G/DL
LDLC SERPL CALC-MCNC: 171 MG/DL (ref ?–100)
MCH RBC QN AUTO: 32.8 PG (ref 26–34)
MCHC RBC AUTO-ENTMCNC: 34.5 G/DL (ref 31–37)
MCV RBC AUTO: 95.1 FL
NONHDLC SERPL-MCNC: 183 MG/DL (ref ?–130)
OSMOLALITY SERPL CALC.SUM OF ELEC: 287 MOSM/KG (ref 275–295)
PLATELET # BLD AUTO: 261 10(3)UL (ref 150–450)
POTASSIUM SERPL-SCNC: 4.4 MMOL/L (ref 3.5–5.1)
PROT SERPL-MCNC: 7.1 G/DL (ref 5.7–8.2)
RBC # BLD AUTO: 3.84 X10(6)UL
SODIUM SERPL-SCNC: 138 MMOL/L (ref 136–145)
TRIGL SERPL-MCNC: 79 MG/DL (ref 30–149)
TSI SER-ACNC: 1.17 UIU/ML (ref 0.55–4.78)
VIT D+METAB SERPL-MCNC: 34.1 NG/ML (ref 30–100)
VLDLC SERPL CALC-MCNC: 16 MG/DL (ref 0–30)
WBC # BLD AUTO: 5.9 X10(3) UL (ref 4–11)

## 2025-04-04 PROCEDURE — 85027 COMPLETE CBC AUTOMATED: CPT

## 2025-04-04 PROCEDURE — 82306 VITAMIN D 25 HYDROXY: CPT

## 2025-04-04 PROCEDURE — 84443 ASSAY THYROID STIM HORMONE: CPT

## 2025-04-04 PROCEDURE — 83036 HEMOGLOBIN GLYCOSYLATED A1C: CPT

## 2025-04-04 PROCEDURE — 80061 LIPID PANEL: CPT

## 2025-04-04 PROCEDURE — 80053 COMPREHEN METABOLIC PANEL: CPT

## 2025-04-04 PROCEDURE — 36415 COLL VENOUS BLD VENIPUNCTURE: CPT

## 2025-04-04 RX ORDER — TRETINOIN 0.5 MG/G
LOTION TOPICAL
COMMUNITY
Start: 2024-09-01

## 2025-04-04 NOTE — PROGRESS NOTES
HPI:    Patient ID: Maria Luz Beasley is a 44 year old female.    HPI  Pt presenting for routine physical exam. Denies any acute issues or recent illnesses. No significant chronic medical problems. Past medical/surgical history, family history, and social history were reviewed.     Left medial great toe numbness  Works from home  Barefoot most of the day  H/o plantar fasciitis, improved from previous  Walking 1H daily    Regular menses    Mood stable, denies SH/SI/HI    Review of Systems   A comprehensive 10 point review of systems was completed.  Pertinent positives and negatives noted in the the HPI.       Current Outpatient Medications   Medication Sig Dispense Refill    ALTRENO 0.05 % External Lotion       TRI-ESTARYLLA 0.18/0.215/0.25 MG-35 MCG Oral Tab Take 1 tablet by mouth daily. Due for yearly annual in December. 84 tablet 3     Allergies:Allergies[1]   Vitals:    04/04/25 0801   BP: 105/69   Pulse: 82   Temp: 98.1 °F (36.7 °C)   SpO2: 98%   Weight: 137 lb (62.1 kg)   Height: 5' 4\" (1.626 m)       Body mass index is 23.52 kg/m².   PHYSICAL EXAM:   Physical Exam  Vitals reviewed.   Constitutional:       General: She is not in acute distress.     Appearance: Normal appearance. She is well-developed.   HENT:      Head: Normocephalic and atraumatic.      Right Ear: Tympanic membrane, ear canal and external ear normal.      Left Ear: Tympanic membrane, ear canal and external ear normal.   Eyes:      Conjunctiva/sclera: Conjunctivae normal.   Neck:      Thyroid: No thyroid mass or thyroid tenderness.   Cardiovascular:      Rate and Rhythm: Normal rate and regular rhythm.      Pulses: Normal pulses.      Heart sounds: Normal heart sounds, S1 normal and S2 normal. No murmur heard.  Pulmonary:      Effort: Pulmonary effort is normal. No respiratory distress.      Breath sounds: Normal breath sounds. No wheezing, rhonchi or rales.   Abdominal:      General: Bowel sounds are normal.      Palpations: Abdomen is  soft.      Tenderness: There is no abdominal tenderness. There is no guarding or rebound.   Musculoskeletal:      Cervical back: Normal range of motion and neck supple. No muscular tenderness.      Right lower leg: No edema.      Left lower leg: No edema.   Lymphadenopathy:      Cervical: No cervical adenopathy.   Skin:     General: Skin is warm and dry.      Coloration: Skin is not jaundiced.   Neurological:      General: No focal deficit present.      Mental Status: She is alert and oriented to person, place, and time. Mental status is at baseline.   Psychiatric:         Attention and Perception: Attention normal.         Mood and Affect: Mood normal.         Behavior: Behavior normal. Behavior is cooperative.         Cognition and Memory: Cognition normal.             ASSESSMENT/PLAN:   1. Well adult exam  - mammogram 3/2025  - Pap 8/2021  - will check labs as below  - encouraged to continue diet/exercise for overall wellness  - follow-up with eye doctor annually  - follow-up with dentist every 6 months  - return yearly for physicals  - annual flu shot  - tetanus booster every 10yrs  - TSH W Reflex To Free T4; Future  - CBC, Platelet; No Differential; Future  - Comp Metabolic Panel (14); Future  - Hemoglobin A1C; Future  - Lipid Panel; Future  - Vitamin D; Future    2. Vitamin D deficiency  Will monitor    Pt verbalized understanding and agrees with plan.    Orders Placed This Encounter   Procedures    TSH W Reflex To Free T4    CBC, Platelet; No Differential    Comp Metabolic Panel (14)    Hemoglobin A1C    Lipid Panel    Vitamin D       Meds This Visit:  Requested Prescriptions      No prescriptions requested or ordered in this encounter       Imaging & Referrals:  None         ID#[id number]       [1] No Known Allergies

## 2025-04-04 NOTE — PATIENT INSTRUCTIONS
Encounter for gynecological examination without abnormal finding Z01.419    Z12.13 Encounter for screening mammogram for malignant neoplasm of breast

## (undated) NOTE — LETTER
VACCINE ADMINISTRATION RECORD  PARENT / GUARDIAN APPROVAL  Date: 2023  Vaccine administered to: Shannan Giron     : 8/10/1980    MRN: [id number]    A copy of the appropriate Centers for Disease Control and Prevention Vaccine Information statement has been provided. I have read or have had explained the information about the diseases and the vaccines listed below. There was an opportunity to ask questions and any questions were answered satisfactorily. I believe that I understand the benefits and risks of the vaccine cited and ask that the vaccine(s) listed below be given to me or to the person named above (for whom I am authorized to make this request). VACCINES ADMINISTERED:  Gardasil    I have read and hereby agree to be bound by the terms of this agreement as stated above. My signature is valid until revoked by me in writing.   This document is signed by Derek Johnson , relationship: Self on 2023.:                                                                                                                                         Parent / Lashell Richter Signature                                                Date

## (undated) NOTE — LETTER
VACCINE ADMINISTRATION RECORD  PARENT / GUARDIAN APPROVAL  Date: 2022  Vaccine administered to: Smith Greek     : 8/10/1980    MRN: [id number]    A copy of the appropriate Centers for Disease Control and Prevention Vaccine Information statement has been provided. I have read or have had explained the information about the diseases and the vaccines listed below. There was an opportunity to ask questions and any questions were answered satisfactorily. I believe that I understand the benefits and risks of the vaccine cited and ask that the vaccine(s) listed below be given to me or to the person named above (for whom I am authorized to make this request). VACCINES ADMINISTERED:  Gardasil    I have read and hereby agree to be bound by the terms of this agreement as stated above. My signature is valid until revoked by me in writing.   This document is signed by Deirdre Pabon, relationship: Self on 2022.:                                                                                                                                         Parent / Ken Cotton Signature                                                Date

## (undated) NOTE — LETTER
1/26/2024              Maria Luz Beasley        [address]        [place] [address]         Dear Maria Luz,    Our records indicate that the tests ordered for you by Johanna Samayoa CNM  have not been done.  If you have, in fact, already completed the tests or you do not wish to have the tests done, please contact our office at THE NUMBER LISTED BELOW.  Otherwise, please proceed with the testing.  Enclosed is a duplicate order for your convenience.        Sincerely,    Johanna Samayoa CNM  [facility] [id number] [phone]